# Patient Record
Sex: MALE | Race: WHITE | NOT HISPANIC OR LATINO | Employment: FULL TIME | URBAN - METROPOLITAN AREA
[De-identification: names, ages, dates, MRNs, and addresses within clinical notes are randomized per-mention and may not be internally consistent; named-entity substitution may affect disease eponyms.]

---

## 2017-01-23 DIAGNOSIS — E78.5 HYPERLIPIDEMIA: ICD-10-CM

## 2017-02-16 ENCOUNTER — APPOINTMENT (EMERGENCY)
Dept: CT IMAGING | Facility: HOSPITAL | Age: 42
End: 2017-02-16
Payer: COMMERCIAL

## 2017-02-16 ENCOUNTER — HOSPITAL ENCOUNTER (EMERGENCY)
Facility: HOSPITAL | Age: 42
Discharge: HOME | End: 2017-02-16
Attending: EMERGENCY MEDICINE | Admitting: EMERGENCY MEDICINE
Payer: COMMERCIAL

## 2017-02-16 ENCOUNTER — APPOINTMENT (EMERGENCY)
Dept: RADIOLOGY | Facility: HOSPITAL | Age: 42
End: 2017-02-16
Payer: COMMERCIAL

## 2017-02-16 VITALS
BODY MASS INDEX: 31.56 KG/M2 | WEIGHT: 220.46 LBS | HEIGHT: 70 IN | TEMPERATURE: 98.6 F | OXYGEN SATURATION: 96 % | HEART RATE: 90 BPM | DIASTOLIC BLOOD PRESSURE: 81 MMHG | SYSTOLIC BLOOD PRESSURE: 130 MMHG | RESPIRATION RATE: 14 BRPM

## 2017-02-16 DIAGNOSIS — R07.1 CHEST PAIN ON BREATHING: Primary | ICD-10-CM

## 2017-02-16 LAB
ANION GAP SERPL CALCULATED.3IONS-SCNC: 8 MMOL/L (ref 4–13)
ATRIAL RATE: 77 BPM
BASOPHILS # BLD AUTO: 0.04 THOUSANDS/ΜL (ref 0–0.1)
BASOPHILS NFR BLD AUTO: 1 % (ref 0–1)
BUN SERPL-MCNC: 21 MG/DL (ref 5–25)
CALCIUM SERPL-MCNC: 9.4 MG/DL (ref 8.3–10.1)
CHLORIDE SERPL-SCNC: 102 MMOL/L (ref 100–108)
CO2 SERPL-SCNC: 30 MMOL/L (ref 21–32)
CREAT SERPL-MCNC: 1.31 MG/DL (ref 0.6–1.3)
DEPRECATED D DIMER PPP: <270 NG/ML (FEU) (ref 0–424)
EOSINOPHIL # BLD AUTO: 0.05 THOUSAND/ΜL (ref 0–0.61)
EOSINOPHIL NFR BLD AUTO: 1 % (ref 0–6)
ERYTHROCYTE [DISTWIDTH] IN BLOOD BY AUTOMATED COUNT: 12.4 % (ref 11.6–15.1)
GFR SERPL CREATININE-BSD FRML MDRD: >60 ML/MIN/1.73SQ M
GLUCOSE SERPL-MCNC: 101 MG/DL (ref 65–140)
HCT VFR BLD AUTO: 46.3 % (ref 36.5–49.3)
HGB BLD-MCNC: 15.8 G/DL (ref 12–17)
LYMPHOCYTES # BLD AUTO: 1.48 THOUSANDS/ΜL (ref 0.6–4.47)
LYMPHOCYTES NFR BLD AUTO: 25 % (ref 14–44)
MCH RBC QN AUTO: 30.6 PG (ref 26.8–34.3)
MCHC RBC AUTO-ENTMCNC: 34.1 G/DL (ref 31.4–37.4)
MCV RBC AUTO: 90 FL (ref 82–98)
MONOCYTES # BLD AUTO: 0.56 THOUSAND/ΜL (ref 0.17–1.22)
MONOCYTES NFR BLD AUTO: 10 % (ref 4–12)
NEUTROPHILS # BLD AUTO: 3.68 THOUSANDS/ΜL (ref 1.85–7.62)
NEUTS SEG NFR BLD AUTO: 63 % (ref 43–75)
NRBC BLD AUTO-RTO: 0 /100 WBCS
P AXIS: 57 DEGREES
PLATELET # BLD AUTO: 281 THOUSANDS/UL (ref 149–390)
PMV BLD AUTO: 10.5 FL (ref 8.9–12.7)
POTASSIUM SERPL-SCNC: 3.9 MMOL/L (ref 3.5–5.3)
PR INTERVAL: 132 MS
QRS AXIS: 10 DEGREES
QRSD INTERVAL: 102 MS
QT INTERVAL: 370 MS
QTC INTERVAL: 418 MS
RBC # BLD AUTO: 5.16 MILLION/UL (ref 3.88–5.62)
SODIUM SERPL-SCNC: 140 MMOL/L (ref 136–145)
T WAVE AXIS: 49 DEGREES
TROPONIN I SERPL-MCNC: <0.02 NG/ML
VENTRICULAR RATE: 77 BPM
WBC # BLD AUTO: 5.84 THOUSAND/UL (ref 4.31–10.16)

## 2017-02-16 PROCEDURE — 85379 FIBRIN DEGRADATION QUANT: CPT | Performed by: EMERGENCY MEDICINE

## 2017-02-16 PROCEDURE — 71275 CT ANGIOGRAPHY CHEST: CPT

## 2017-02-16 PROCEDURE — 84484 ASSAY OF TROPONIN QUANT: CPT | Performed by: EMERGENCY MEDICINE

## 2017-02-16 PROCEDURE — 36415 COLL VENOUS BLD VENIPUNCTURE: CPT | Performed by: EMERGENCY MEDICINE

## 2017-02-16 PROCEDURE — 85025 COMPLETE CBC W/AUTO DIFF WBC: CPT | Performed by: EMERGENCY MEDICINE

## 2017-02-16 PROCEDURE — 93005 ELECTROCARDIOGRAM TRACING: CPT

## 2017-02-16 PROCEDURE — 99285 EMERGENCY DEPT VISIT HI MDM: CPT

## 2017-02-16 PROCEDURE — 80048 BASIC METABOLIC PNL TOTAL CA: CPT | Performed by: EMERGENCY MEDICINE

## 2017-02-16 PROCEDURE — 93005 ELECTROCARDIOGRAM TRACING: CPT | Performed by: EMERGENCY MEDICINE

## 2017-02-16 RX ORDER — ASPIRIN 81 MG/1
324 TABLET, CHEWABLE ORAL ONCE
Status: COMPLETED | OUTPATIENT
Start: 2017-02-16 | End: 2017-02-16

## 2017-02-16 RX ADMIN — IOHEXOL 85 ML: 350 INJECTION, SOLUTION INTRAVENOUS at 16:25

## 2017-02-16 RX ADMIN — ASPIRIN 81 MG 324 MG: 81 TABLET ORAL at 15:58

## 2017-02-17 ENCOUNTER — APPOINTMENT (OUTPATIENT)
Dept: LAB | Facility: OTHER | Age: 42
End: 2017-02-17
Payer: COMMERCIAL

## 2017-02-17 ENCOUNTER — TRANSCRIBE ORDERS (OUTPATIENT)
Dept: LAB | Facility: OTHER | Age: 42
End: 2017-02-17

## 2017-02-17 DIAGNOSIS — E78.5 HYPERLIPIDEMIA: ICD-10-CM

## 2017-02-17 LAB
ALBUMIN SERPL BCP-MCNC: 3.9 G/DL (ref 3.5–5)
ALP SERPL-CCNC: 75 U/L (ref 46–116)
ALT SERPL W P-5'-P-CCNC: 73 U/L (ref 12–78)
ANION GAP SERPL CALCULATED.3IONS-SCNC: 4 MMOL/L (ref 4–13)
AST SERPL W P-5'-P-CCNC: 33 U/L (ref 5–45)
BILIRUB SERPL-MCNC: 0.57 MG/DL (ref 0.2–1)
BUN SERPL-MCNC: 11 MG/DL (ref 5–25)
CALCIUM SERPL-MCNC: 9.1 MG/DL (ref 8.3–10.1)
CHLORIDE SERPL-SCNC: 104 MMOL/L (ref 100–108)
CHOLEST SERPL-MCNC: 237 MG/DL (ref 50–200)
CO2 SERPL-SCNC: 30 MMOL/L (ref 21–32)
CREAT SERPL-MCNC: 1.23 MG/DL (ref 0.6–1.3)
GFR SERPL CREATININE-BSD FRML MDRD: >60 ML/MIN/1.73SQ M
GLUCOSE SERPL-MCNC: 101 MG/DL (ref 65–140)
HDLC SERPL-MCNC: 51 MG/DL (ref 40–60)
LDLC SERPL CALC-MCNC: 137 MG/DL (ref 0–100)
POTASSIUM SERPL-SCNC: 5.3 MMOL/L (ref 3.5–5.3)
PROT SERPL-MCNC: 7.6 G/DL (ref 6.4–8.2)
SODIUM SERPL-SCNC: 138 MMOL/L (ref 136–145)
TRIGL SERPL-MCNC: 244 MG/DL

## 2017-02-17 PROCEDURE — 80053 COMPREHEN METABOLIC PANEL: CPT

## 2017-02-17 PROCEDURE — 36415 COLL VENOUS BLD VENIPUNCTURE: CPT

## 2017-02-17 PROCEDURE — 80061 LIPID PANEL: CPT

## 2017-02-19 ENCOUNTER — GENERIC CONVERSION - ENCOUNTER (OUTPATIENT)
Dept: OTHER | Facility: OTHER | Age: 42
End: 2017-02-19

## 2017-02-24 ENCOUNTER — ALLSCRIPTS OFFICE VISIT (OUTPATIENT)
Dept: OTHER | Facility: OTHER | Age: 42
End: 2017-02-24

## 2017-02-24 DIAGNOSIS — R07.89 OTHER CHEST PAIN: ICD-10-CM

## 2017-07-02 DIAGNOSIS — R07.89 OTHER CHEST PAIN: ICD-10-CM

## 2017-07-02 DIAGNOSIS — E78.5 HYPERLIPIDEMIA: ICD-10-CM

## 2017-09-07 ENCOUNTER — ALLSCRIPTS OFFICE VISIT (OUTPATIENT)
Dept: OTHER | Facility: OTHER | Age: 42
End: 2017-09-07

## 2017-09-07 DIAGNOSIS — E78.5 HYPERLIPIDEMIA: ICD-10-CM

## 2017-09-07 DIAGNOSIS — Z13.1 ENCOUNTER FOR SCREENING FOR DIABETES MELLITUS: ICD-10-CM

## 2017-10-13 ENCOUNTER — ALLSCRIPTS OFFICE VISIT (OUTPATIENT)
Dept: OTHER | Facility: OTHER | Age: 42
End: 2017-10-13

## 2017-10-13 DIAGNOSIS — M10.9 GOUT: ICD-10-CM

## 2017-10-16 NOTE — PROGRESS NOTES
Assessment  1  Tinea corporis (110 5) (B35 4)   2  Acute gouty arthropathy (274 01) (M10 9)    Plan  Acute gouty arthropathy    · Indomethacin 50 MG Oral Capsule; TAKE 1 CAPSULE 3 TIMES DAILY WITH FOOD  AS NEEDED   · (1) URIC ACID; Status:Active; Requested for:13Oct2017;   Tinea corporis    · Terbinafine HCl - 250 MG Oral Tablet; TAKE 1 TABLET DAILY    Discussion/Summary    1  Tinea discussed with patient with how widespread will treat with oral medications the terbinafine  patient will call with an update of symptoms  Toe complaints appearing to be related to having gout discussed with patient will give the indomethacin for pain as needed and also discussed checking the uric acid  Also discussed watching his intake of beer and red meat  Patient will call for any additional symptoms  Possible side effects of new medications were reviewed with the patient/guardian today  The treatment plan was reviewed with the patient/guardian  The patient/guardian understands and agrees with the treatment plan      Chief Complaint  Rash and foot pains      History of Present Illness  Patient here with complaints of having a rash under both arms and red and raised and itch and tried hydrocortisone cream stopped the itch but did not go away  Patient also has complaints of having a right great toe pain in the base of the toe and thinking maybe gout one episode about 6 months ago similar taking ibuprofen elevated and put ice on the toe improving can walk on the foot  No gout in the family  Patient was drinking beer and eating meat camping out over the weekend  Review of Systems    Constitutional: as noted in HPI  Eyes: No complaints of eye pain, no red eyes, no discharge from eyes, no itchy eyes  ENT: no complaints of earache, no hearing loss, no nosebleeds, no nasal discharge, no sore throat, no hoarseness     Cardiovascular: No complaints of slow heart rate, no fast heart rate, no chest pain, no palpitations, no leg claudication, no lower extremity  Respiratory: No complaints of shortness of breath, no wheezing, no cough, no SOB on exertion, no orthopnea or PND  Gastrointestinal: No complaints of abdominal pain, no constipation, no nausea or vomiting, no diarrhea or bloody stools  Musculoskeletal: as noted in HPI  Integumentary: as noted in HPI  Neurological: No compliants of headache, no confusion, no convulsions, no numbness or tingling, no dizziness or fainting, no limb weakness, no difficulty walking  Psychiatric: Is not suicidal, no sleep disturbances, no anxiety or depression, no change in personality, no emotional problems  ROS reviewed  Active Problems  1  Atopic dermatitis (691 8) (L20 9)   2  Atypical chest pain (786 59) (R07 89)   3  Diabetes mellitus screening (V77 1) (Z13 1)   4  Diarrhea (787 91) (R19 7)   5  Hyperlipidemia (272 4) (E78 5)   6  Need for diphtheria-tetanus-pertussis (Tdap) vaccine, adult/adolescent (V06 1) (Z23)   7  Need for prophylactic vaccination and inoculation against influenza (V04 81) (Z23)   8  Screening for lipid disorders (V77 91) (O98 122)    Past Medical History  1  History of No acute medical problems    The active problems and past medical history were reviewed and updated today  Surgical History    The surgical history was reviewed and updated today  Family History  Mother    1  Denied: Family history of substance abuse   2  Denied: Family history of Mental problems   3  Family history of Ovarian cancer  Father    4  Denied: Family history of substance abuse   5  Denied: Family history of Mental problems    The family history was reviewed and updated today  Social History   · Never a smoker   · Social alcohol use (Z78 9)  The social history was reviewed and updated today  Current Meds   1  Hydrocortisone 2 5 % External Ointment; APPLY SPARINGLY TO THE AFFECTED   AREA(S) TWICE DAILY;    Therapy: [id number] to (Last Rx:[id number]) Ordered    The medication list was reviewed and updated today  Allergies  1  No Known Drug Allergies  2  Gluten    Vitals  Vital Signs    Recorded: [id number] 02:43PM   Temperature 98 F   Heart Rate 86   Systolic 207   Diastolic 84   Height 5 ft 10 in   Weight 215 lb 2 08 oz   BMI Calculated 30 87   BSA Calculated 2 16   O2 Saturation 98     Physical Exam    Constitutional   General appearance: No acute distress, well appearing and well nourished  Pulmonary   Respiratory effort: No increased work of breathing or signs of respiratory distress  Auscultation of lungs: Clear to auscultation, equal breath sounds bilaterally, no wheezes, no rales, no rhonci  Cardiovascular   Auscultation of heart: Normal rate and rhythm, normal S1 and S2, without murmurs  Examination of extremities for edema and/or varicosities: Normal     Abdomen   Abdomen: Non-tender, no masses  Musculoskeletal   Digits and nails: Abnormal  -- right great toe base erythematous and tender  Skin   Skin and subcutaneous tissue: Abnormal  -- bilateral underarms circular raised lesions circular scaling circular with erythematous base          Signatures   Electronically signed by : Cat Woodward, [address] [place] [address]; Oct 13 2017  9:47PM EST                       (Author)    Electronically signed by : Malorie Bonilla MD; Oct 15 2017  1:03PM EST                       (Co-author)

## 2017-10-25 ENCOUNTER — GENERIC CONVERSION - ENCOUNTER (OUTPATIENT)
Dept: OTHER | Facility: OTHER | Age: 42
End: 2017-10-25

## 2018-01-10 NOTE — PROGRESS NOTES
Assessment    1  Need for diphtheria-tetanus-pertussis (Tdap) vaccine, adult/adolescent (V06 1) (Z23)   2  Screening for lipid disorders (V77 91) (Z13 220)   3  Diabetes mellitus screening (V77 1) (Z13 1)   4  Diarrhea (787 91) (R19 7)    Plan  Diabetes mellitus screening    · (1) COMPREHENSIVE METABOLIC PANEL; Status:Active; Requested for:23Mar2016;    · (1) LIPID PANEL, FASTING; Status:Active; Requested for:23Mar2016;   Diarrhea    · (1) CBC/ PLT (NO DIFF); Status:Active; Requested for:23Mar2016;    · (1) TISSUE TRANSGLUTAMINASE IGA; Status:Active; Requested for:23Mar2016;   Need for diphtheria-tetanus-pertussis (Tdap) vaccine, adult/adolescent    · Stop: Adacel 5-2-15 5 LF-MCG/0 5 Intramuscular Suspension  Need for prophylactic vaccination and inoculation against influenza    · Stop: Fluzone Quadrivalent 0 5 ML Intramuscular Suspension    Discussion/Summary    Diarrhea- gluten enteropathy, will order IgA TTG, CBC to look for anemia  Advise to avoid gluten in the meantime  Health Maintenance- lipid profile and CMP ordered  Chief Complaint  Patient is here for wellness visit and possible allergy to gluten  History of Present Illness  HPI: Patient is here for health maintenance, denies any medical problems not taking any medications  Patient does not smoke  Patient did not receive flu vaccine this year  Patient says that he tries to have a healthy diet and avoids gluten as it makes him sick and gives him diarrhea and heart burn  Patient says that when he eats foods containing gluten he has diarrhea and upset stomach  He has bee avoiding those types of foods, has never been tested for it  Elevated BP- patient says that he has elevated values in the past however never been diagnosed with Hypertension and never been on medications  Review of Systems    Constitutional: No fever or chills, feels well, no tiredness, no recent weight gain or weight loss     Cardiovascular: No complaints of slow heart rate, no fast heart rate, no chest pain, no palpitations, no leg claudication, no lower extremity  Respiratory: No complaints of shortness of breath, no wheezing, no cough, no SOB on exertion, no orthopnea or PND  Gastrointestinal: diarrhea, but as noted in HPI  Musculoskeletal: No complaints of arthralgia, no myalgias, no joint swelling or stiffness, no limb pain or swelling  Integumentary: No complaints of skin rash or skin lesions, no itching, no skin wound, no dry skin  ROS reviewed  Active Problems    1  Need for prophylactic vaccination and inoculation against influenza (V04 81) (Z23)    Past Medical History    · History of No acute medical problems    Family History    · Denied: Family history of substance abuse   · Denied: Family history of Mental problems   · Family history of Ovarian cancer    · Denied: Family history of substance abuse   · Denied: Family history of Mental problems    Social History    · Never a smoker   · Social alcohol use (Z78 9)    Current Meds   1  No Reported Medications Recorded    Allergies    1  No Known Drug Allergies    2  Gluten    Vitals   Recorded: 03AKB1434 09:29AM   Temperature 97 9 F   Heart Rate 71   Systolic 705   Diastolic 82   Height 5 ft 10 in   Weight 213 lb 2 08 oz   BMI Calculated 30 58   BSA Calculated 2 15   O2 Saturation 97     Physical Exam    Constitutional   General appearance: No acute distress, well appearing and well nourished  Pulmonary   Respiratory effort: No increased work of breathing or signs of respiratory distress  Auscultation of lungs: Clear to auscultation  Cardiovascular   Auscultation of heart: Normal rate and rhythm, normal S1 and S2, no murmurs  Abdomen   Abdomen: Non-tender, no masses      Musculoskeletal   Gait and station: Normal        Results/Data  PHQ-9 Adult Depression Screening 23Mar2016 09:40AM User, [employer]     Test Name Result Flag Reference   PHQ-9 Adult Depression Score 0     Q1: 0, Q2: 0, Q3: 0, Q4: 0, Q5: 0, Q6: 0, Q7: 0, Q8: 0, Q9: 0   PHQ-9 Adult Depression Screening Negative     PHQ-9 Difficulty Level Not difficult at all     PHQ-9 Severity No Depression         Signatures   Electronically signed by : Gi Denise MD; Mar 23 2016 10:10AM EST                       (Author)

## 2018-01-10 NOTE — RESULT NOTES
Message   Needs OV to go over labs  Has seen Shawanda Adele only     Verified Results  (1) LIPID PANEL, FASTING [id number] 09:25AM Irwin Moralez Order Number: [id number]     Test Name Result Flag Reference   CHOLESTEROL 237 mg/dL H    HDL,DIRECT 51 mg/dL  40-60   Specimen collection should occur prior to Metamizole administration due to the potential for falsely depressed results  LDL CHOLESTEROL CALCULATED 137 mg/dL H 0-100   - Patient Instructions: This is a fasting blood test  Water,black tea or black  coffee only after 9:00pm the night before test   Drink 2 glasses of water the morning of test     - Patient Instructions: This is a fasting blood test  Water,black tea or black  coffee only after 9:00pm the night before test Drink 2 glasses of water the morning of test   Triglyceride:         Normal              <150 mg/dl       Borderline High    150-199 mg/dl       High               200-499 mg/dl       Very High          >499 mg/dl  Cholesterol:         Desirable        <200 mg/dl      Borderline High  200-239 mg/dl      High             >239 mg/dl  HDL Cholesterol:        High    >59 mg/dL      Low     <41 mg/dL  LDL CALCULATED:    This screening LDL is a calculated result  It does not have the accuracy of the Direct Measured LDL in the monitoring of patients with hyperlipidemia and/or statin therapy  Direct Measure LDL (KQW715) must be ordered separately in these patients  TRIGLYCERIDES 244 mg/dL H <=150   Specimen collection should occur prior to N-Acetylcysteine or Metamizole administration due to the potential for falsely depressed results  (1) COMPREHENSIVE METABOLIC PANEL [id number] 20:14NI Irwin Moralez Order Number: [id number]     Test Name Result Flag Reference   GLUCOSE,RANDM 101 mg/dL     If the patient is fasting, the ADA then defines impaired fasting glucose as > 100 mg/dL and diabetes as > or equal to 123 mg/dL     SODIUM 138 mmol/L  136-145 POTASSIUM 5 3 mmol/L  3 5-5 3   CHLORIDE 104 mmol/L  100-108   CARBON DIOXIDE 30 mmol/L  21-32   ANION GAP (CALC) 4 mmol/L  4-13   BLOOD UREA NITROGEN 11 mg/dL  5-25   CREATININE 1 23 mg/dL  0 60-1 30   Standardized to IDMS reference method   CALCIUM 9 1 mg/dL  8 3-10 1   BILI, TOTAL 0 57 mg/dL  0 20-1 00   ALK PHOSPHATAS 75 U/L     ALT (SGPT) 73 U/L  12-78   AST(SGOT) 33 U/L  5-45   ALBUMIN 3 9 g/dL  3 5-5 0   TOTAL PROTEIN 7 6 g/dL  6 4-8 2   eGFR Non-African American      >60 0 ml/min/1 73sq m   - Patient Instructions: This is a fasting blood test  Water,black tea or black  coffee only after 9:00pm the night before test Drink 2 glasses of water the morning of test   National Kidney Disease Education Program recommendations are as follows:  GFR calculation is accurate only with a steady state creatinine  Chronic Kidney disease less than 60 ml/min/1 73 sq  meters  Kidney failure less than 15 ml/min/1 73 sq  meters

## 2018-01-13 VITALS
HEIGHT: 70 IN | DIASTOLIC BLOOD PRESSURE: 84 MMHG | HEART RATE: 86 BPM | SYSTOLIC BLOOD PRESSURE: 128 MMHG | BODY MASS INDEX: 30.8 KG/M2 | WEIGHT: 215.13 LBS | TEMPERATURE: 98 F | OXYGEN SATURATION: 98 %

## 2018-01-14 VITALS
HEART RATE: 74 BPM | OXYGEN SATURATION: 98 % | BODY MASS INDEX: 30.49 KG/M2 | SYSTOLIC BLOOD PRESSURE: 128 MMHG | WEIGHT: 213 LBS | DIASTOLIC BLOOD PRESSURE: 88 MMHG | HEIGHT: 70 IN

## 2018-01-14 VITALS
BODY MASS INDEX: 31.07 KG/M2 | TEMPERATURE: 98.4 F | DIASTOLIC BLOOD PRESSURE: 86 MMHG | SYSTOLIC BLOOD PRESSURE: 130 MMHG | WEIGHT: 217 LBS | HEART RATE: 84 BPM | HEIGHT: 70 IN | OXYGEN SATURATION: 97 %

## 2018-02-13 ENCOUNTER — TELEPHONE (OUTPATIENT)
Dept: FAMILY MEDICINE CLINIC | Facility: CLINIC | Age: 43
End: 2018-02-13

## 2018-02-13 NOTE — TELEPHONE ENCOUNTER
Patient saw both dr Aj De La Garza and you about a rash, he was given two medications and neither helped  He called dermatology and was told it would be 6 months before they could get him in, but added if he was referred by his pcp they could get him in sooner  The rash is getting worse, raised, red itches and burns    Did you want him to come in again or just make the referral?

## 2018-02-15 ENCOUNTER — OFFICE VISIT (OUTPATIENT)
Dept: FAMILY MEDICINE CLINIC | Facility: CLINIC | Age: 43
End: 2018-02-15
Payer: COMMERCIAL

## 2018-02-15 VITALS
HEIGHT: 70 IN | HEART RATE: 85 BPM | DIASTOLIC BLOOD PRESSURE: 92 MMHG | SYSTOLIC BLOOD PRESSURE: 130 MMHG | WEIGHT: 228.6 LBS | BODY MASS INDEX: 32.73 KG/M2 | OXYGEN SATURATION: 97 % | TEMPERATURE: 98.1 F

## 2018-02-15 DIAGNOSIS — L24.89 IRRITANT CONTACT DERMATITIS DUE TO OTHER AGENTS: Primary | ICD-10-CM

## 2018-02-15 PROCEDURE — 99213 OFFICE O/P EST LOW 20 MIN: CPT | Performed by: NURSE PRACTITIONER

## 2018-02-15 RX ORDER — TRIAMCINOLONE ACETONIDE 1 MG/G
CREAM TOPICAL 2 TIMES DAILY
Qty: 30 G | Refills: 0 | Status: SHIPPED | OUTPATIENT
Start: 2018-02-15 | End: 2018-10-24

## 2018-02-15 NOTE — PATIENT INSTRUCTIONS
Complications of Infection   AMBULATORY CARE:   Complications of infection  can happen if an infection is not diagnosed and treated early  Some infections may have complications even when they are treated early  The infection can spread from one place in your body to the entire body through your bloodstream  Early diagnosis and treatment may prevent complications such as bacteremia, sepsis, and septic shock  These are serious, life-threatening conditions that need immediate treatment  · Bacteremia  is when there is bacteria in the blood  Bacteremia can happen when infections in other parts of the body, such as the lungs, kidneys, or skin, travel to the blood  It can also happen when indwelling catheters, such as a central venous access devices, pacemaker wires, or urinary catheters become infected  A central venous access device is a special IV that is placed in a large vein and left there for an extended period of time  · Sepsis  happens when an infection spreads and causes the body to react strongly to the germs  The body's defense system normally releases chemicals to fight off infection at the infected area  In sepsis, chemicals are released throughout the body  The chemicals cause inflammation and can cause clotting in small blood vessels that is difficult to control  Inflammation and clotting decreases blood flow and oxygen to organs  This may cause them to stop working correctly  Sepsis is also called systemic inflammatory response syndrome (SIRS) due to infection  · Septic shock  is a severe type of sepsis that happens as sepsis gets worse and causes multiple organs to shut down  The blood pressure drops very low and organs do not get enough blood  This may cause permanent damage to organs    Signs and symptoms that an infection has become worse:   · Fever or very low body temperature with chills and violent shaking    · Swelling in the ankles or legs    · A change in mental status such as confusion, loss of consciousness, or seizures    · A fast or irregular heartbeat    · Urinating very little or not at all     · Difficulty breathing, dizziness, or weakness    · A rash or warm, red skin  Call 911 or have someone else call for any of the following:   · You have any of the following signs of a heart attack:      ¨ Squeezing, pressure, or pain in your chest that lasts longer than 5 minutes or returns    ¨ Discomfort or pain in your back, neck, jaw, stomach, or arm     ¨ Trouble breathing    ¨ Nausea or vomiting    ¨ Lightheadedness or a sudden cold sweat, especially with chest pain or trouble breathing    · You have a seizure or lose consciousness  · You have trouble breathing  · Your lips or fingernails are blue  · You feel extremely weak and have a hard time moving  Seek care immediately if:   · Your symptoms, such as fever, get worse, even if you are taking medicine to treat the infection  · You have increased swelling in your legs, feet, or abdomen  · You feel weak, dizzy, or faint  · You stop urinating or urinate very little  Contact your healthcare provider if:   · You have questions or concerns about your condition or care  Treatment  may depend on how severe the complications are  You may need monitoring and treatment in the hospital  You may  need any of the following:  · Removal or change of a catheter  may be needed to get rid of the infection  · Medicines  may be given to increase your blood pressure and blood flow to your organs  Antibiotics may be given to treat an infection  Medicines may also be given to decrease inflammation, control your blood sugar, prevent stomach ulcers, and prevent blood clots  · Surgery or other procedures  may be needed to treat problems causing sepsis or related to the complications of your infection  This may include draining an abscess or removing infected tissue    Follow up with your healthcare provider as directed:  Write down your questions so you remember to ask them during your visits  © 2017 [employer] Information is for End User's use only and may not be sold, redistributed or otherwise used for commercial purposes  All illustrations and images included in CareNotes® are the copyrighted property of [employer]  or Ervin Garza  The above information is an  only  It is not intended as medical advice for individual conditions or treatments  Talk to your doctor, nurse or pharmacist before following any medical regimen to see if it is safe and effective for you

## 2018-02-15 NOTE — PROGRESS NOTES
Assessment/Plan:    No problem-specific Assessment & Plan notes found for this encounter  Diagnoses and all orders for this visit:    Irritant contact dermatitis due to other agents  Comments:  Case reviewed with Dr Agustin Cruz will trial a topical steroid and if not clearing referral for dermatology   Orders:  -     triamcinolone (KENALOG) 0 1 % cream; Apply topically 2 (two) times a day          Subjective:      Patient ID: Yoly Salazar is a 40 y o  male  Rash   This is a chronic problem  The current episode started more than 1 month ago  The problem is unchanged  The affected locations include the left axilla and right axilla  The rash is characterized by pain, itchiness and burning  It is unknown if there was an exposure to a precipitant  Pertinent negatives include no anorexia, congestion, cough, diarrhea, eye pain, facial edema, fatigue, fever, joint pain, nail changes, rhinorrhea, shortness of breath, sore throat or vomiting  Past treatments include anti-itch cream, topical steroids and moisturizer  The treatment provided no relief  The following portions of the patient's history were reviewed and updated as appropriate: He  has no past medical history on file  He  does not have any pertinent problems on file  He  has no past surgical history on file  His family history is not on file  He  reports that he has never smoked  He does not have any smokeless tobacco history on file  He reports that he drinks alcohol  He reports that he does not use drugs  He has No Known Allergies       Review of Systems   Constitutional: Negative for fatigue and fever  HENT: Negative for congestion, rhinorrhea and sore throat  Eyes: Negative for pain  Respiratory: Negative for cough and shortness of breath  Gastrointestinal: Negative for anorexia, diarrhea and vomiting  Musculoskeletal: Negative for joint pain  Skin: Positive for rash  Negative for nail changes           Objective:    Vitals: 02/15/18 1050   BP: 130/92   Pulse: 85   Temp: 98 1 °F (36 7 °C)   SpO2: 97%        Physical Exam   Constitutional: He is oriented to person, place, and time  Vital signs are normal  He appears well-developed and well-nourished  No distress  HENT:   Head: Normocephalic and atraumatic  Eyes: Pupils are equal, round, and reactive to light  Neck: Normal range of motion  No thyromegaly present  Cardiovascular: Normal rate, regular rhythm, normal heart sounds and intact distal pulses  No murmur heard  Pulmonary/Chest: Effort normal and breath sounds normal  No respiratory distress  He has no wheezes  Abdominal: Soft  Bowel sounds are normal    Musculoskeletal: Normal range of motion  Neurological: He is alert and oriented to person, place, and time  Skin: Skin is warm and dry  Rash noted  Psychiatric: He has a normal mood and affect

## 2018-03-15 ENCOUNTER — TELEPHONE (OUTPATIENT)
Dept: FAMILY MEDICINE CLINIC | Facility: CLINIC | Age: 43
End: 2018-03-15

## 2018-03-15 DIAGNOSIS — R21 RASH AND NONSPECIFIC SKIN ERUPTION: Primary | ICD-10-CM

## 2018-03-29 ENCOUNTER — TELEPHONE (OUTPATIENT)
Dept: FAMILY MEDICINE CLINIC | Facility: CLINIC | Age: 43
End: 2018-03-29

## 2018-03-29 NOTE — TELEPHONE ENCOUNTER
Pt called for  Dermatologist info because he hadnt heard from them in regard to his rash at armpits  Its getting worse  He called them and they had no record of you referring him to them  The earliest new patient appt is sept  He cant wait till then  He said their office stated if YOU call them and refer him the appt will be sooner   He wasn't happy

## 2018-05-07 ENCOUNTER — OFFICE VISIT (OUTPATIENT)
Dept: FAMILY MEDICINE CLINIC | Facility: CLINIC | Age: 43
End: 2018-05-07
Payer: COMMERCIAL

## 2018-05-07 ENCOUNTER — DOCUMENTATION (OUTPATIENT)
Dept: FAMILY MEDICINE CLINIC | Facility: CLINIC | Age: 43
End: 2018-05-07

## 2018-05-07 ENCOUNTER — TELEPHONE (OUTPATIENT)
Dept: FAMILY MEDICINE CLINIC | Facility: CLINIC | Age: 43
End: 2018-05-07

## 2018-05-07 VITALS
DIASTOLIC BLOOD PRESSURE: 88 MMHG | TEMPERATURE: 98.3 F | SYSTOLIC BLOOD PRESSURE: 140 MMHG | BODY MASS INDEX: 30.69 KG/M2 | HEIGHT: 70 IN | WEIGHT: 214.4 LBS | HEART RATE: 82 BPM | OXYGEN SATURATION: 98 %

## 2018-05-07 DIAGNOSIS — M10.9 ACUTE GOUTY ARTHROPATHY: Primary | ICD-10-CM

## 2018-05-07 DIAGNOSIS — M10.9 ACUTE GOUT OF RIGHT ANKLE, UNSPECIFIED CAUSE: Primary | ICD-10-CM

## 2018-05-07 PROCEDURE — 3008F BODY MASS INDEX DOCD: CPT | Performed by: FAMILY MEDICINE

## 2018-05-07 PROCEDURE — 99213 OFFICE O/P EST LOW 20 MIN: CPT | Performed by: FAMILY MEDICINE

## 2018-05-07 RX ORDER — PREDNISONE 20 MG/1
20 TABLET ORAL 2 TIMES DAILY WITH MEALS
Qty: 10 TABLET | Refills: 1 | Status: SHIPPED | OUTPATIENT
Start: 2018-05-07 | End: 2018-05-11 | Stop reason: SDUPTHER

## 2018-05-07 RX ORDER — TRAMADOL HYDROCHLORIDE 50 MG/1
50 TABLET ORAL EVERY 6 HOURS PRN
Qty: 20 TABLET | Refills: 0 | Status: SHIPPED | OUTPATIENT
Start: 2018-05-07 | End: 2018-10-24

## 2018-05-07 NOTE — PATIENT INSTRUCTIONS
Gout   AMBULATORY CARE:   Gout  is a form of arthritis that causes severe joint pain and stiffness  Acute gout pain starts suddenly, gets worse quickly, and stops on its own  Acute gout can become chronic and cause permanent damage to your joints  Seek care immediately if:   · You have severe pain in one or more of your joints that you cannot tolerate  · You have a fever or redness that spreads beyond the joint area  Contact your healthcare provider if:   · You have new symptoms, such as a rash, after you start gout treatment  · Your joint pain and swelling do not go away, even after treatment  · You are not urinating as much or as often as you usually do  · You have trouble taking your gout medicines  · You have questions or concerns about your condition or care  Stages of gout:   · Hyperuricemia  starts with high levels of uric acid  Hyperuricemia is not gout, but it increases your risk for gout  You may have no symptoms at this stage, and it usually does not need treatment  · Acute gouty arthritis  starts with a sudden attack of pain and swelling, usually in 1 joint  The attack may last from a few days to 2 weeks  · Intercritical gout  is the time between attacks  You may go months or years without another attack  You will not have joint pain or stiffness, but this does not mean your gout is cured  You will still need treatment to prevent chronic gout  · Chronic tophaceous gout  develops if gout is not treated  Large amounts of uric acid crystals, called tophi, collect around your joints  The crystals can destroy or deform the joints  Gout attacks occur more often, and last hours to weeks  More than 1 joint may be painful and swollen  At this stage, gout symptoms do not go away on their own  Treatment  can make your symptoms stop sooner, prevent attacks, and decrease your risk of joint damage   You may need any of the following:  · Medicines:      ¨ NSAIDs , such as ibuprofen, help decrease swelling, pain, and fever  This medicine is available with or without a doctor's order  NSAIDs can cause stomach bleeding or kidney problems in certain people  If you take blood thinner medicine, always ask your healthcare provider if NSAIDs are safe for you  Always read the medicine label and follow directions  ¨ Gout medicine  decreases joint pain and swelling  It may also be given to prevent new gout attacks  ¨ Steroids  reduce inflammation and can help your joint stiffness and pain during gout attacks  ¨ Uric acid medicine  may be given to reduce the amount of uric acid your body makes  Some medicines may help you pass more uric acid when you urinate  ¨ Take your medicine as directed  Contact your healthcare provider if you think your medicine is not helping or if you have side effects  Tell him or her if you are allergic to any medicine  Keep a list of the medicines, vitamins, and herbs you take  Include the amounts, and when and why you take them  Bring the list or the pill bottles to follow-up visits  Carry your medicine list with you in case of an emergency  · Surgery  called a bone graft may be needed for tophi that are painful or infected  Bone in the joint may be replaced with bone taken from another place in your body  Ask your healthcare provider for more information about bone graft surgery  Manage gout:   · Rest your painful joint so it can heal   Your healthcare provider may recommend crutches or a walker if the affected joint is in a leg  · Apply ice to your joint  Ice decreases pain and swelling  Use an ice pack, or put crushed ice in a plastic bag  Cover the ice pack or bag with a towel before you apply it to your painful joint  Apply ice for 15 to 20 minutes every hour, or as directed  · Elevate your joint  Elevation helps reduce swelling and pain  Raise your joint above the level of your heart as often as you can   Prop your painful joint on pillows to keep it above your heart comfortably  · Go to physical therapy if directed  A physical therapist can teach you exercises to improve flexibility and range of motion  Prevent gout attacks:   · Do not eat high-purine foods  These foods include meats, seafood, asparagus, spinach, cauliflower, and some types of beans  Healthcare providers may tell you to eat more low-fat milk products, such as yogurt  Milk products may decrease your risk for gout attacks  Vitamin C and coffee may also help  Your healthcare provider or dietitian can help you create a meal plan  · Drink more liquids as directed  Liquids such as water help remove uric acid from your body  Ask how much liquid to drink each day and which liquids are best for you  · Manage your weight  Weight loss may decrease the amount of uric acid in your body  Exercise can help you lose weight  Talk to your healthcare provider about the best exercises for you  · Control your blood sugar level if you have diabetes  Keep your blood sugar level in a normal range  This can help prevent gout attacks  · Limit or do not drink alcohol as directed  Alcohol can trigger a gout attack  Alcohol also increases your risk for dehydration  Ask your healthcare provider if alcohol is safe for you  Follow up with your healthcare provider as directed:  Write down your questions so you remember to ask them during your visits  © 2017 [address] Information is for End User's use only and may not be sold, redistributed or otherwise used for commercial purposes  All illustrations and images included in CareNotes® are the copyrighted property of [employer]  or Ervin Garza  The above information is an  only  It is not intended as medical advice for individual conditions or treatments  Talk to your doctor, nurse or pharmacist before following any medical regimen to see if it is safe and effective for you

## 2018-05-07 NOTE — LETTER
May 7, 2018     Patient: Sharda Jarrell   YOB: 1975   Date of Visit: 5/7/2018       To Whom it May Concern:    Angle Cason is under my professional care  He was seen in my office on 5/7/2018  He should return to work on 5/8/18  If you have any questions or concerns, please don't hesitate to call           Sincerely,          Ho Maldonado MD        CC: No Recipients

## 2018-05-07 NOTE — PROGRESS NOTES
Assessment/Plan:         Diagnoses and all orders for this visit:    Acute gouty arthropathy  -     predniSONE 20 mg tablet; Take 1 tablet (20 mg total) by mouth 2 (two) times a day with meals        Take prednisone twice a day for 5 days  I did give him a refill to take on his trip  Call if not better in 4-5 days  Subjective:      Patient ID: Sandra Norman is a 40 y o  male  He is here for right ankle swelling and pain  He tried rest, elevation, ice, ibuprofen  Today he states he could barely get his shoe on or walk on the foot  He has had gout in the past in the right toe  He states he has been following [employer] and wonders if this is what caused a flare up  He eats a lot of meat  He is going to [facility] this weekend and needs to be better for his trip  The following portions of the patient's history were reviewed and updated as appropriate:   He  has no past medical history on file  He   Patient Active Problem List    Diagnosis Date Noted    Irritant contact dermatitis due to other agents 02/15/2018    Acute gouty arthropathy 10/13/2017    Hyperlipidemia 03/31/2016     He  has no past surgical history on file  His family history is not on file  He  reports that he has never smoked  He has never used smokeless tobacco  He reports that he drinks alcohol  He reports that he does not use drugs  Current Outpatient Prescriptions   Medication Sig Dispense Refill    predniSONE 20 mg tablet Take 1 tablet (20 mg total) by mouth 2 (two) times a day with meals 10 tablet 1    triamcinolone (KENALOG) 0 1 % cream Apply topically 2 (two) times a day 30 g 0     No current facility-administered medications for this visit  Current Outpatient Prescriptions on File Prior to Visit   Medication Sig    triamcinolone (KENALOG) 0 1 % cream Apply topically 2 (two) times a day     No current facility-administered medications on file prior to visit        He is allergic to gluten meal     Review of Systems   Constitutional: Negative  Negative for fever  Musculoskeletal: Positive for arthralgias  Objective:      /88   Pulse 82   Temp 98 3 °F (36 8 °C)   Ht 5' 10" (1 778 m)   Wt 97 3 kg (214 lb 6 4 oz)   SpO2 98%   BMI 30 76 kg/m²          Physical Exam   Constitutional: He appears well-developed and well-nourished  No distress  Uncomfortable   Pulmonary/Chest: Effort normal    Musculoskeletal:        Right ankle: He exhibits swelling  He exhibits no ecchymosis  Tenderness  Neurological: He is alert  Psychiatric: He has a normal mood and affect  His behavior is normal  Judgment and thought content normal    Nursing note and vitals reviewed

## 2018-05-07 NOTE — TELEPHONE ENCOUNTER
The prednisone will help reduce the inflammation which is causing the pain    If he wants something like tramadol which is a mild narcotic I can send in a few

## 2018-05-07 NOTE — TELEPHONE ENCOUNTER
Pt was just in and he was under the impression that he was going to receive prednisone and something for pain but he only got the prednisone at [facility]

## 2018-05-11 DIAGNOSIS — M10.9 ACUTE GOUTY ARTHROPATHY: ICD-10-CM

## 2018-05-11 RX ORDER — PREDNISONE 20 MG/1
20 TABLET ORAL 2 TIMES DAILY WITH MEALS
Qty: 10 TABLET | Refills: 0 | Status: SHIPPED | OUTPATIENT
Start: 2018-05-11 | End: 2018-05-25 | Stop reason: SDUPTHER

## 2018-05-11 NOTE — TELEPHONE ENCOUNTER
Patient called and stated that his foot feels about 50 % better still having pain - he stated that you gave him prednisone to take with him on his vacation but the pharmacy is telling him that it was already filled  Can you send in another RX?

## 2018-05-25 ENCOUNTER — TELEPHONE (OUTPATIENT)
Dept: FAMILY MEDICINE CLINIC | Facility: CLINIC | Age: 43
End: 2018-05-25

## 2018-05-25 DIAGNOSIS — M10.9 ACUTE GOUTY ARTHROPATHY: ICD-10-CM

## 2018-05-25 RX ORDER — PREDNISONE 20 MG/1
TABLET ORAL
Qty: 18 TABLET | Refills: 0 | Status: SHIPPED | OUTPATIENT
Start: 2018-05-25 | End: 2018-10-24

## 2018-05-25 NOTE — TELEPHONE ENCOUNTER
Was seen for gout about 2 weeks ago and was given prenisone    Having a flare up again in the same foot and ankle  Can we give more prednisone

## 2018-05-25 NOTE — TELEPHONE ENCOUNTER
Patient called again to check status  He was wondering if you preferred to see him or if you would rather refill the prednisone RX

## 2018-09-10 DIAGNOSIS — M10.9 ACUTE GOUT OF RIGHT ANKLE, UNSPECIFIED CAUSE: ICD-10-CM

## 2018-09-10 DIAGNOSIS — M10.9 ACUTE GOUTY ARTHROPATHY: ICD-10-CM

## 2018-09-10 RX ORDER — TRAMADOL HYDROCHLORIDE 50 MG/1
50 TABLET ORAL EVERY 6 HOURS PRN
Qty: 20 TABLET | Refills: 0 | OUTPATIENT
Start: 2018-09-10

## 2018-09-10 RX ORDER — PREDNISONE 20 MG/1
TABLET ORAL
Qty: 18 TABLET | Refills: 0 | OUTPATIENT
Start: 2018-09-10

## 2018-09-10 NOTE — TELEPHONE ENCOUNTER
Has another flare up of gout now in his ankle- was given tramadol and prednisone before - can we rx them again or do you need to see him     Please call

## 2018-10-24 ENCOUNTER — HOSPITAL ENCOUNTER (EMERGENCY)
Facility: HOSPITAL | Age: 43
Discharge: HOME | End: 2018-10-24
Attending: EMERGENCY MEDICINE | Admitting: EMERGENCY MEDICINE
Payer: COMMERCIAL

## 2018-10-24 ENCOUNTER — APPOINTMENT (EMERGENCY)
Dept: RADIOLOGY | Facility: HOSPITAL | Age: 43
End: 2018-10-24
Payer: COMMERCIAL

## 2018-10-24 VITALS
OXYGEN SATURATION: 96 % | WEIGHT: 208 LBS | HEART RATE: 74 BPM | SYSTOLIC BLOOD PRESSURE: 141 MMHG | BODY MASS INDEX: 29.78 KG/M2 | TEMPERATURE: 98.3 F | RESPIRATION RATE: 19 BRPM | HEIGHT: 70 IN | DIASTOLIC BLOOD PRESSURE: 91 MMHG

## 2018-10-24 DIAGNOSIS — R07.9 CHEST PAIN, UNSPECIFIED TYPE: Primary | ICD-10-CM

## 2018-10-24 LAB
ANION GAP SERPL CALCULATED.3IONS-SCNC: 7 MMOL/L (ref 4–13)
ATRIAL RATE: 78 BPM
BASOPHILS # BLD AUTO: 0.04 THOUSANDS/ΜL (ref 0–0.1)
BASOPHILS NFR BLD AUTO: 1 % (ref 0–1)
BUN SERPL-MCNC: 12 MG/DL (ref 5–25)
CALCIUM SERPL-MCNC: 9.9 MG/DL (ref 8.3–10.1)
CHLORIDE SERPL-SCNC: 106 MMOL/L (ref 100–108)
CO2 SERPL-SCNC: 31 MMOL/L (ref 21–32)
CREAT SERPL-MCNC: 1.08 MG/DL (ref 0.6–1.3)
EOSINOPHIL # BLD AUTO: 0.06 THOUSAND/ΜL (ref 0–0.61)
EOSINOPHIL NFR BLD AUTO: 1 % (ref 0–6)
ERYTHROCYTE [DISTWIDTH] IN BLOOD BY AUTOMATED COUNT: 12.9 % (ref 11.6–15.1)
GFR SERPL CREATININE-BSD FRML MDRD: 84 ML/MIN/1.73SQ M
GLUCOSE SERPL-MCNC: 101 MG/DL (ref 65–140)
HCT VFR BLD AUTO: 46.1 % (ref 36.5–49.3)
HGB BLD-MCNC: 15.2 G/DL (ref 12–17)
IMM GRANULOCYTES # BLD AUTO: 0.01 THOUSAND/UL (ref 0–0.2)
IMM GRANULOCYTES NFR BLD AUTO: 0 % (ref 0–2)
LYMPHOCYTES # BLD AUTO: 1.27 THOUSANDS/ΜL (ref 0.6–4.47)
LYMPHOCYTES NFR BLD AUTO: 25 % (ref 14–44)
MCH RBC QN AUTO: 30.2 PG (ref 26.8–34.3)
MCHC RBC AUTO-ENTMCNC: 33 G/DL (ref 31.4–37.4)
MCV RBC AUTO: 92 FL (ref 82–98)
MONOCYTES # BLD AUTO: 0.36 THOUSAND/ΜL (ref 0.17–1.22)
MONOCYTES NFR BLD AUTO: 7 % (ref 4–12)
NEUTROPHILS # BLD AUTO: 3.27 THOUSANDS/ΜL (ref 1.85–7.62)
NEUTS SEG NFR BLD AUTO: 66 % (ref 43–75)
NRBC BLD AUTO-RTO: 0 /100 WBCS
P AXIS: 118 DEGREES
PLATELET # BLD AUTO: 262 THOUSANDS/UL (ref 149–390)
PMV BLD AUTO: 10.2 FL (ref 8.9–12.7)
POTASSIUM SERPL-SCNC: 5.2 MMOL/L (ref 3.5–5.3)
PR INTERVAL: 144 MS
QRS AXIS: 193 DEGREES
QRSD INTERVAL: 106 MS
QT INTERVAL: 398 MS
QTC INTERVAL: 453 MS
RBC # BLD AUTO: 5.03 MILLION/UL (ref 3.88–5.62)
SODIUM SERPL-SCNC: 144 MMOL/L (ref 136–145)
T WAVE AXIS: 136 DEGREES
TROPONIN I SERPL-MCNC: <0.02 NG/ML
VENTRICULAR RATE: 78 BPM
WBC # BLD AUTO: 5.01 THOUSAND/UL (ref 4.31–10.16)

## 2018-10-24 PROCEDURE — 93010 ELECTROCARDIOGRAM REPORT: CPT | Performed by: INTERNAL MEDICINE

## 2018-10-24 PROCEDURE — 99285 EMERGENCY DEPT VISIT HI MDM: CPT

## 2018-10-24 PROCEDURE — 93005 ELECTROCARDIOGRAM TRACING: CPT

## 2018-10-24 PROCEDURE — 84484 ASSAY OF TROPONIN QUANT: CPT | Performed by: NURSE PRACTITIONER

## 2018-10-24 PROCEDURE — 36415 COLL VENOUS BLD VENIPUNCTURE: CPT | Performed by: NURSE PRACTITIONER

## 2018-10-24 PROCEDURE — 80048 BASIC METABOLIC PNL TOTAL CA: CPT | Performed by: NURSE PRACTITIONER

## 2018-10-24 PROCEDURE — 85025 COMPLETE CBC W/AUTO DIFF WBC: CPT | Performed by: NURSE PRACTITIONER

## 2018-10-24 PROCEDURE — 71046 X-RAY EXAM CHEST 2 VIEWS: CPT

## 2018-10-24 PROCEDURE — 96372 THER/PROPH/DIAG INJ SC/IM: CPT

## 2018-10-24 RX ORDER — NAPROXEN 500 MG/1
500 TABLET ORAL 2 TIMES DAILY WITH MEALS
Qty: 10 TABLET | Refills: 0 | Status: SHIPPED | OUTPATIENT
Start: 2018-10-24 | End: 2019-04-12

## 2018-10-24 RX ORDER — ACETAMINOPHEN 325 MG/1
975 TABLET ORAL ONCE
Status: COMPLETED | OUTPATIENT
Start: 2018-10-24 | End: 2018-10-24

## 2018-10-24 RX ORDER — KETOROLAC TROMETHAMINE 30 MG/ML
30 INJECTION, SOLUTION INTRAMUSCULAR; INTRAVENOUS ONCE
Status: COMPLETED | OUTPATIENT
Start: 2018-10-24 | End: 2018-10-24

## 2018-10-24 RX ADMIN — ACETAMINOPHEN 975 MG: 325 TABLET, FILM COATED ORAL at 15:03

## 2018-10-24 RX ADMIN — KETOROLAC TROMETHAMINE 30 MG: 30 INJECTION, SOLUTION INTRAMUSCULAR at 15:00

## 2018-10-24 NOTE — ED PROVIDER NOTES
History  Chief Complaint   Patient presents with    Chest Pain     pt c/o cp and pressure since this morning  28-year-old male patient presents here with chief complaint of chest pressure since this morning  He has had episodes like this in the past and reports that previously he was told it was anxiety  It seems that his pain is at the peak of inspiration then goes away  There is no other associated diaphoresis no nausea no vomiting  No pain in his back no radiation of the discomfort seems to be directly over the left chest area and somewhat reproducible with inspiration and manipulation of the torso  None       History reviewed  No pertinent past medical history  History reviewed  No pertinent surgical history  Family History   Problem Relation Age of Onset    Ovarian cancer Mother      I have reviewed and agree with the history as documented  Social History   Substance Use Topics    Smoking status: Light Tobacco Smoker     Types: Cigars    Smokeless tobacco: Never Used    Alcohol use Yes      Comment: social        Review of Systems   Constitutional: Negative for diaphoresis, fatigue and fever  HENT: Negative for congestion, ear pain, nosebleeds and sore throat  Eyes: Negative for photophobia, pain, discharge and visual disturbance  Respiratory: Negative for cough, choking, chest tightness, shortness of breath and wheezing  Cardiovascular: Positive for chest pain  Negative for palpitations  Gastrointestinal: Negative for abdominal distention, abdominal pain, diarrhea and vomiting  Genitourinary: Negative for dysuria, flank pain and frequency  Musculoskeletal: Negative for back pain, gait problem and joint swelling  Skin: Negative for color change and rash  Neurological: Negative for dizziness, syncope and headaches  Psychiatric/Behavioral: Negative for behavioral problems and confusion  The patient is not nervous/anxious      All other systems reviewed and are negative  Physical Exam  Physical Exam   Constitutional: He is oriented to person, place, and time  He appears well-developed and well-nourished  HENT:   Head: Normocephalic and atraumatic  Eyes: Pupils are equal, round, and reactive to light  Neck: Normal range of motion  Neck supple  Cardiovascular: Normal rate, regular rhythm, normal heart sounds and normal pulses  PMI is not displaced  Pulmonary/Chest: Effort normal and breath sounds normal  No respiratory distress  Abdominal: Soft  He exhibits no distension  There is no guarding  Musculoskeletal: Normal range of motion  Lymphadenopathy:     He has no cervical adenopathy  Neurological: He is alert and oriented to person, place, and time  Skin: Skin is warm and dry  No rash noted  He is not diaphoretic  No pallor  Psychiatric: He has a normal mood and affect  Vitals reviewed        Vital Signs  ED Triage Vitals [10/24/18 1344]   Temperature Pulse Respirations Blood Pressure SpO2   98 3 °F (36 8 °C) 88 20 139/90 99 %      Temp Source Heart Rate Source Patient Position - Orthostatic VS BP Location FiO2 (%)   Oral Monitor Sitting Left arm --      Pain Score       5           Vitals:    10/24/18 1344   BP: 139/90   Pulse: 88   Patient Position - Orthostatic VS: Sitting       Visual Acuity      ED Medications  Medications   ketorolac (TORADOL) injection 30 mg (30 mg Intramuscular Given 10/24/18 1500)   acetaminophen (TYLENOL) tablet 975 mg (975 mg Oral Given 10/24/18 1503)       Diagnostic Studies  Results Reviewed     Procedure Component Value Units Date/Time    Troponin I [[id number]]  (Normal) Collected:  10/24/18 1459    Lab Status:  Final result Specimen:  Blood from Arm, Right Updated:  10/24/18 1528     Troponin I <0 02 ng/mL     Basic metabolic panel [[id number]] Collected:  10/24/18 1459    Lab Status:  Final result Specimen:  Blood from Arm, Right Updated:  10/24/18 1523     Sodium 144 mmol/L      Potassium 5 2 mmol/L Chloride 106 mmol/L      CO2 31 mmol/L      ANION GAP 7 mmol/L      BUN 12 mg/dL      Creatinine 1 08 mg/dL      Glucose 101 mg/dL      Calcium 9 9 mg/dL      eGFR 84 ml/min/1 73sq m     Narrative:         National Kidney Disease Education Program recommendations are as follows:  GFR calculation is accurate only with a steady state creatinine  Chronic Kidney disease less than 60 ml/min/1 73 sq  meters  Kidney failure less than 15 ml/min/1 73 sq  meters      CBC and differential [[id number]] Collected:  10/24/18 1459    Lab Status:  Final result Specimen:  Blood from Arm, Right Updated:  10/24/18 1509     WBC 5 01 Thousand/uL      RBC 5 03 Million/uL      Hemoglobin 15 2 g/dL      Hematocrit 46 1 %      MCV 92 fL      MCH 30 2 pg      MCHC 33 0 g/dL      RDW 12 9 %      MPV 10 2 fL      Platelets 956 Thousands/uL      nRBC 0 /100 WBCs      Neutrophils Relative 66 %      Immat GRANS % 0 %      Lymphocytes Relative 25 %      Monocytes Relative 7 %      Eosinophils Relative 1 %      Basophils Relative 1 %      Neutrophils Absolute 3 27 Thousands/µL      Immature Grans Absolute 0 01 Thousand/uL      Lymphocytes Absolute 1 27 Thousands/µL      Monocytes Absolute 0 36 Thousand/µL      Eosinophils Absolute 0 06 Thousand/µL      Basophils Absolute 0 04 Thousands/µL                  XR chest 2 views    (Results Pending)              Procedures  ECG 12 Lead Documentation  Date/Time: 10/24/2018 2:54 PM  Performed by: Darwin Nolan by: Stuart Oro     ECG reviewed by me, the ED Provider: yes    Patient location:  ED  Previous ECG:     Previous ECG:  Unavailable  Interpretation:     Interpretation: normal    Rate:     ECG rate:  78    ECG rate assessment: normal    Rhythm:     Rhythm: sinus rhythm    Ectopy:     Ectopy: none    QRS:     QRS axis:  Normal  Conduction:     Conduction: abnormal      Abnormal conduction: incomplete RBBB    ST segments:     ST segments:  Normal  T waves:     T waves: normal Phone Contacts  ED Phone Contact    ED Course         HEART Risk Score      Most Recent Value   History  0 Filed at: 10/24/2018 1453   ECG  0 Filed at: 10/24/2018 1453   Age  0 Filed at: 10/24/2018 1453   Risk Factors  1 Filed at: 10/24/2018 1453   Troponin  0 Filed at: 10/24/2018 1453   Heart Score Risk Calculator   History  0 Filed at: 10/24/2018 1453   ECG  0 Filed at: 10/24/2018 1453   Age  0 Filed at: 10/24/2018 1453   Risk Factors  1 Filed at: 10/24/2018 1453   Troponin  0 Filed at: 10/24/2018 1453   HEART Score  1 Filed at: 10/24/2018 1453   HEART Score  1 Filed at: 10/24/2018 1453                            MDM  Number of Diagnoses or Management Options  Chest pain, unspecified type: new and requires workup  Diagnosis management comments: Chest pain most likely muscular in nature  Will treat with anti-inflammatories  Return if worsening or not improving or follow up with PCP  Amount and/or Complexity of Data Reviewed  Clinical lab tests: ordered and reviewed  Tests in the radiology section of CPT®: reviewed and ordered  Tests in the medicine section of CPT®: reviewed and ordered  Independent visualization of images, tracings, or specimens: yes      CritCare Time    Disposition  Final diagnoses:   Chest pain, unspecified type     Time reflects when diagnosis was documented in both MDM as applicable and the Disposition within this note     Time User Action Codes Description Comment    10/24/2018  2:54 PM Huong Hayes Add [R07 9] Chest pain, unspecified type       ED Disposition     None      Follow-up Information     Follow up With Specialties Details Why [address] [place] [address] MD [facility]  For Continued Evaluation [address] [facility],[address]  [address] Emergency Department Emergency Medicine  If your symptoms worsen, or you are not improving   [facility]  [phone] [place] [facility], [address], [place], [address]          Patient's Medications   Discharge Prescriptions    NAPROXEN (NAPROSYN) 500 MG TABLET    Take 1 tablet (500 mg total) by mouth 2 (two) times a day with meals for 5 days       Start Date: 10/24/2018End Date: 10/29/2018       Order Dose: 500 mg       Quantity: 10 tablet    Refills: 0     No discharge procedures on file      ED Provider  Electronically Signed by           BRITTNI Pang  10/24/18 6706

## 2018-10-24 NOTE — DISCHARGE INSTRUCTIONS

## 2018-10-25 ENCOUNTER — OFFICE VISIT (OUTPATIENT)
Dept: FAMILY MEDICINE CLINIC | Facility: CLINIC | Age: 43
End: 2018-10-25
Payer: COMMERCIAL

## 2018-10-25 VITALS
HEIGHT: 70 IN | SYSTOLIC BLOOD PRESSURE: 130 MMHG | TEMPERATURE: 98.2 F | RESPIRATION RATE: 16 BRPM | OXYGEN SATURATION: 97 % | BODY MASS INDEX: 30.84 KG/M2 | DIASTOLIC BLOOD PRESSURE: 90 MMHG | HEART RATE: 84 BPM | WEIGHT: 215.4 LBS

## 2018-10-25 DIAGNOSIS — Z13.1 DIABETES MELLITUS SCREENING: ICD-10-CM

## 2018-10-25 DIAGNOSIS — R03.0 ELEVATED BLOOD PRESSURE READING WITHOUT DIAGNOSIS OF HYPERTENSION: Primary | ICD-10-CM

## 2018-10-25 DIAGNOSIS — R07.9 CHEST PAIN, UNSPECIFIED TYPE: ICD-10-CM

## 2018-10-25 DIAGNOSIS — Z13.220 NEED FOR LIPID SCREENING: ICD-10-CM

## 2018-10-25 PROCEDURE — 99214 OFFICE O/P EST MOD 30 MIN: CPT | Performed by: FAMILY MEDICINE

## 2018-10-25 NOTE — PROGRESS NOTES
Assessment/Plan:    No problem-specific Assessment & Plan notes found for this encounter  Diagnoses and all orders for this visit:    Elevated blood pressure reading without diagnosis of hypertension  He was advised to measure his blood pressure daily in the evening if elevated above 140/90 mmHg on 3 separate occasions consider medications  Counseled in regards to lifestyle modifications  Chest pain, unspecified type  Referred to cardiology for evaluation blood work ordered to look for risks factors such as diabetes and cholesterol  Diabetes mellitus screening  -     Comprehensive metabolic panel; Future  -     Hemoglobin A1C; Future    Need for lipid screening  -     Lipid panel; Future      Follow up in 2 weeks    Subjective:      Patient ID: Alexandru Vogt is a 44 y o  male  Patient is here to follow-up for an ER visit yesterday at [address] [facility] due to chest pain  He was walking his dog early in the morning and felt some chest discomfort  He denies any nausea vomiting associated with his chest discomfort  He denies any radiation of the pain  He ended up in emergency room and was evaluated with an EKG and troponin both were negative for ischemia at that point  He denies any chest discomfort or pain at this time  He had pleurisy associated with his symptoms  He denies any history of asthma or COPD  He is not a smoker  He smokes occasional cigars on the weekends  He does have a family history of hypertension  Does not take any medications  His cholesterol was found to be elevated last year per blood work  The following portions of the patient's history were reviewed and updated as appropriate:   He  has no past medical history on file    He   Patient Active Problem List    Diagnosis Date Noted    Elevated blood pressure reading without diagnosis of hypertension 10/25/2018    Chest pain 10/25/2018    Diabetes mellitus screening 10/25/2018    Need for lipid screening 10/25/2018    Irritant contact dermatitis due to other agents 02/15/2018    Acute gouty arthropathy 10/13/2017    Hyperlipidemia 03/31/2016     He  has no past surgical history on file  His family history includes Ovarian cancer in his mother  He  reports that he has been smoking Cigars  He has never used smokeless tobacco  He reports that he drinks alcohol  He reports that he does not use drugs  Current Outpatient Prescriptions   Medication Sig Dispense Refill    naproxen (NAPROSYN) 500 mg tablet Take 1 tablet (500 mg total) by mouth 2 (two) times a day with meals for 5 days (Patient not taking: Reported on 10/25/2018 ) 10 tablet 0     No current facility-administered medications for this visit  Current Outpatient Prescriptions on File Prior to Visit   Medication Sig    naproxen (NAPROSYN) 500 mg tablet Take 1 tablet (500 mg total) by mouth 2 (two) times a day with meals for 5 days (Patient not taking: Reported on 10/25/2018 )     Current Facility-Administered Medications on File Prior to Visit   Medication    [COMPLETED] acetaminophen (TYLENOL) tablet 975 mg    [COMPLETED] ketorolac (TORADOL) injection 30 mg     He is allergic to gluten meal     Review of Systems   Constitutional: Negative for activity change, appetite change, fatigue and fever  HENT: Negative for congestion and ear discharge  Respiratory: Negative for cough and shortness of breath  Cardiovascular: Negative for chest pain and palpitations  Gastrointestinal: Negative for diarrhea and nausea  Musculoskeletal: Negative for arthralgias and back pain  Skin: Negative for color change and rash  Neurological: Negative for dizziness and headaches  Psychiatric/Behavioral: Negative for agitation and behavioral problems           Objective:      /90 (BP Location: Left arm, Patient Position: Sitting, Cuff Size: Adult)   Pulse 84   Temp 98 2 °F (36 8 °C) (Tympanic)   Resp 16   Ht 5' 10" (1 778 m)   Wt 97 7 kg (215 lb 6 4 oz)   SpO2 97%   BMI 30 91 kg/m²          Physical Exam   Constitutional: He is oriented to person, place, and time  He appears well-developed and well-nourished  No distress  Eyes: Pupils are equal, round, and reactive to light  No scleral icterus  Cardiovascular: Normal rate, regular rhythm and normal heart sounds  No murmur heard  Pulmonary/Chest: Effort normal and breath sounds normal  No respiratory distress  He has no wheezes  Abdominal: Soft  Bowel sounds are normal  He exhibits no distension  There is no tenderness  Neurological: He is alert and oriented to person, place, and time  Skin: Skin is warm and dry  No rash noted  He is not diaphoretic  Psychiatric: He has a normal mood and affect

## 2018-10-26 ENCOUNTER — OFFICE VISIT (OUTPATIENT)
Dept: CARDIOLOGY CLINIC | Facility: CLINIC | Age: 43
End: 2018-10-26
Payer: COMMERCIAL

## 2018-10-26 ENCOUNTER — APPOINTMENT (OUTPATIENT)
Dept: LAB | Facility: CLINIC | Age: 43
End: 2018-10-26
Payer: COMMERCIAL

## 2018-10-26 VITALS
HEIGHT: 70 IN | HEART RATE: 68 BPM | SYSTOLIC BLOOD PRESSURE: 126 MMHG | BODY MASS INDEX: 30.78 KG/M2 | WEIGHT: 215 LBS | DIASTOLIC BLOOD PRESSURE: 84 MMHG

## 2018-10-26 DIAGNOSIS — R07.9 CHEST PAIN, UNSPECIFIED TYPE: Primary | ICD-10-CM

## 2018-10-26 DIAGNOSIS — Z13.1 DIABETES MELLITUS SCREENING: ICD-10-CM

## 2018-10-26 DIAGNOSIS — R03.0 ELEVATED BLOOD PRESSURE READING WITHOUT DIAGNOSIS OF HYPERTENSION: ICD-10-CM

## 2018-10-26 DIAGNOSIS — E78.2 MIXED HYPERLIPIDEMIA: ICD-10-CM

## 2018-10-26 DIAGNOSIS — Z13.220 NEED FOR LIPID SCREENING: ICD-10-CM

## 2018-10-26 LAB
ALBUMIN SERPL BCP-MCNC: 4 G/DL (ref 3.5–5)
ALP SERPL-CCNC: 67 U/L (ref 46–116)
ALT SERPL W P-5'-P-CCNC: 40 U/L (ref 12–78)
ANION GAP SERPL CALCULATED.3IONS-SCNC: 7 MMOL/L (ref 4–13)
AST SERPL W P-5'-P-CCNC: 25 U/L (ref 5–45)
BILIRUB SERPL-MCNC: 0.54 MG/DL (ref 0.2–1)
BUN SERPL-MCNC: 16 MG/DL (ref 5–25)
CALCIUM SERPL-MCNC: 9.4 MG/DL (ref 8.3–10.1)
CHLORIDE SERPL-SCNC: 108 MMOL/L (ref 100–108)
CHOLEST SERPL-MCNC: 228 MG/DL (ref 50–200)
CO2 SERPL-SCNC: 27 MMOL/L (ref 21–32)
CREAT SERPL-MCNC: 1.24 MG/DL (ref 0.6–1.3)
EST. AVERAGE GLUCOSE BLD GHB EST-MCNC: 103 MG/DL
GFR SERPL CREATININE-BSD FRML MDRD: 71 ML/MIN/1.73SQ M
GLUCOSE P FAST SERPL-MCNC: 88 MG/DL (ref 65–99)
HBA1C MFR BLD: 5.2 % (ref 4.2–6.3)
HDLC SERPL-MCNC: 40 MG/DL (ref 40–60)
LDLC SERPL CALC-MCNC: 128 MG/DL (ref 0–100)
NONHDLC SERPL-MCNC: 188 MG/DL
POTASSIUM SERPL-SCNC: 4.9 MMOL/L (ref 3.5–5.3)
PROT SERPL-MCNC: 7.6 G/DL (ref 6.4–8.2)
SODIUM SERPL-SCNC: 142 MMOL/L (ref 136–145)
TRIGL SERPL-MCNC: 301 MG/DL

## 2018-10-26 PROCEDURE — 36415 COLL VENOUS BLD VENIPUNCTURE: CPT

## 2018-10-26 PROCEDURE — 80053 COMPREHEN METABOLIC PANEL: CPT

## 2018-10-26 PROCEDURE — 83036 HEMOGLOBIN GLYCOSYLATED A1C: CPT

## 2018-10-26 PROCEDURE — 80061 LIPID PANEL: CPT

## 2018-10-26 PROCEDURE — 99244 OFF/OP CNSLTJ NEW/EST MOD 40: CPT | Performed by: INTERNAL MEDICINE

## 2018-10-26 NOTE — PROGRESS NOTES
Subjective:        Patient ID: Geno Churchill is a 44 y o  male  Chief Complaint:  Kelly Pastrana is here for cardiac consultation your kind request   Yoel Naranjo he has had 2 episodes now in the last year of pleuritic-type chest pain  Once he felt chest heaviness about 8 months ago worsened by deep inspiration ER workup was negative  Recently, reviewed ER records, he had chest pain with deep inspiration again but it lasted most of the day every time he took a deep breath so this concerned him and he went to the emergency room for evaluation  EKG reviewed and was was nonischemic, cardiac enzymes were negative, chest x-ray unrevealing  No one was ill at home recently  He has been telling me that for years he will watch borderline hypertensive readings  He admits to daytime hypersomnolence, excessive snoring, and a partner that has been telling him that he stops breathing at night with apneic periods witnessed  He does not eat much salt, he does not take any NSAIDs  He had any been taking naproxen prescribed by the E [facility]  Pain has resolved on its own  No travel, no recent tick bites or rashes  Family history fairly strong for hypertension not significant for premature CAD or sudden death  No history rheumatic fever prior cardiac disease diagnosed  The following portions of the patient's history were reviewed and updated as appropriate: allergies, current medications, past family history, past medical history, past social history, past surgical history and problem list   Review of Systems   Constitution: Negative for chills, diaphoresis, malaise/fatigue and weight gain  HENT: Negative for nosebleeds and stridor  Eyes: Negative for double vision, vision loss in left eye, vision loss in right eye and visual disturbance  Cardiovascular: Positive for chest pain   Negative for claudication, cyanosis, dyspnea on exertion, irregular heartbeat, leg swelling, near-syncope, orthopnea, palpitations, paroxysmal nocturnal dyspnea and syncope  Respiratory: Negative for cough, shortness of breath, snoring and wheezing  Endocrine: Negative for polydipsia, polyphagia and polyuria  Hematologic/Lymphatic: Negative for bleeding problem  Does not bruise/bleed easily  Skin: Negative for flushing and rash  Musculoskeletal: Negative for falls and myalgias  Gastrointestinal: Negative for abdominal pain, heartburn, hematemesis, hematochezia, melena and nausea  Genitourinary: Negative for hematuria  Neurological: Negative for brief paralysis, dizziness, focal weakness, headaches, light-headedness, loss of balance and vertigo  Psychiatric/Behavioral: Negative for altered mental status and substance abuse  Allergic/Immunologic: Negative for hives  Objective:     Physical Exam   Constitutional: He is oriented to person, place, and time  He appears well-developed and well-nourished  No distress  HENT:   Head: Normocephalic and atraumatic  Eyes: Pupils are equal, round, and reactive to light  EOM are normal  No scleral icterus  Neck: Normal range of motion  Neck supple  No JVD present  No thyromegaly present  Cardiovascular: Normal rate, regular rhythm and normal heart sounds  Exam reveals no gallop and no friction rub  No murmur heard  Pulmonary/Chest: Effort normal and breath sounds normal  No stridor  No respiratory distress  He has no wheezes  He has no rales  Abdominal: Soft  Bowel sounds are normal  He exhibits no distension and no mass  There is no tenderness  Musculoskeletal: Normal range of motion  He exhibits no edema or deformity  Neurological: He is alert and oriented to person, place, and time  Coordination normal    Skin: Skin is warm and dry  No erythema  No pallor  Psychiatric: He has a normal mood and affect   His behavior is normal        Lab Review:   Admission on 10/24/2018, Discharged on 10/24/2018   Component Date Value    WBC 10/24/2018 5 01     RBC 10/24/2018 5 03     Hemoglobin 10/24/2018 15 2     Hematocrit 10/24/2018 46 1     MCV 10/24/2018 92     MCH 10/24/2018 30 2     MCHC 10/24/2018 33 0     RDW 10/24/2018 12 9     MPV 10/24/2018 10 2     Platelets 27/08/7076 262     nRBC 10/24/2018 0     Neutrophils Relative 10/24/2018 66     Immat GRANS % 10/24/2018 0     Lymphocytes Relative 10/24/2018 25     Monocytes Relative 10/24/2018 7     Eosinophils Relative 10/24/2018 1     Basophils Relative 10/24/2018 1     Neutrophils Absolute 10/24/2018 3 27     Immature Grans Absolute 10/24/2018 0 01     Lymphocytes Absolute 10/24/2018 1 27     Monocytes Absolute 10/24/2018 0 36     Eosinophils Absolute 10/24/2018 0 06     Basophils Absolute 10/24/2018 0 04     Sodium 10/24/2018 144     Potassium 10/24/2018 5 2     Chloride 10/24/2018 106     CO2 10/24/2018 31     ANION GAP 10/24/2018 7     BUN 10/24/2018 12     Creatinine 10/24/2018 1 08     Glucose 10/24/2018 101     Calcium 10/24/2018 9 9     eGFR 10/24/2018 84     Troponin I 10/24/2018 <0 02     Ventricular Rate 10/24/2018 78     Atrial Rate 10/24/2018 78     TN Interval 10/24/2018 144     QRSD Interval 10/24/2018 106     QT Interval 10/24/2018 398     QTC Interval 10/24/2018 453     P Axis 10/24/2018 118     QRS Axis 10/24/2018 193     T Wave Axis 10/24/2018 136          Assessment:       1  Chest pain, unspecified type  Ambulatory referral to Cardiology    Echo stress test w contrast if indicated   2  Mixed hyperlipidemia     3  Elevated blood pressure reading without diagnosis of hypertension          Plan:       His Chest pain is more pleuritic likely musculoskeletal in etiology  No trauma history  There were no significant clinical findings consistent with pericarditis  He does have a few cardiac risk factors and for peace of mind since this has now recurred I would advocate ruling out any coronary insufficiency albeit unlikely    Exercise stress echocardiogram ordered to rule out coronary insufficiency as well to make sure there is no pericardial process going on though his mediastinal silhouette was normal in his recent chest x-ray making this unlikely as well  This will also let us look at his blood pressure, and LV mass, particularly with exertion though I do not think this is playing a role in his chest pain syndrome  As long as the stress test is negative I saw return him to your care  I would give strong consideration to a sleep apnea workup, this may be a cause of hypertension as you well know  If he does not have sleep apnea I would then consider being proactive at treating his hypertension  I will leave this in your capable hands  Should you need any assistance here or with lipid management I would be glad to assist   I generally choose statins in light of their morbidity mortality reduction benefit  Thanks for sending him this way

## 2018-10-26 NOTE — PATIENT INSTRUCTIONS
Chest Pain   AMBULATORY CARE:   Chest pain  can be caused by a range of conditions, from not serious to life-threatening  It may be caused by a heart attack or a blood clot in your lungs  Sometimes chest pain or pressure is caused by poor blood flow to your heart (angina)  Infection, inflammation, or a fracture in the bones or cartilage in your chest can cause pain or discomfort  Chest pain can also be a symptom of a digestive problem, such as acid reflux or a stomach ulcer  An anxiety attack or a strong emotion such as anger can also cause chest pain  It is important to follow up with your healthcare provider to find the cause of your chest pain  Common symptoms you may have with chest pain:   · Fever or sweating     · Nausea or vomiting     · Shortness of breath     · Discomfort or pressure that spreads from your chest to your back, jaw, or arm     · A racing or slow heartbeat     · Feeling weak, tired, or faint  Call 911 if:   · You have any of the following signs of a heart attack:      ¨ Squeezing, pressure, or pain in your chest that lasts longer than 5 minutes or returns    ¨ Discomfort or pain in your back, neck, jaw, stomach, or arm     ¨ Trouble breathing    ¨ Nausea or vomiting    ¨ Lightheadedness or a sudden cold sweat, especially with chest pain or trouble breathing    Seek care immediately if:   · You have chest discomfort that gets worse, even with medicine  · You cough or vomit blood  · Your bowel movements are black or bloody  · You cannot stop vomiting, or it hurts to swallow  Contact your healthcare provider if:   · You have questions or concerns about your condition or care  Treatment for chest pain  may include medicine to treat your symptoms while your healthcare provider finds the cause of your chest pain  · Medicines  may be given to treat the cause of your chest pain  Examples include pain medicine, anxiety medicine, or medicines to increase blood flow to your heart  · Do not take certain medicines without asking your healthcare provider first   These include NSAIDs, herbal or vitamin supplements, or hormones (estrogen or progestin)  Follow up with your healthcare provider within 72 hours, or as directed: You may need to return for more tests to find the cause of your chest pain  You may be referred to a specialist, such as a cardiologist or gastroenterologist  Write down your questions so you remember to ask them during your visits  Healthy living tips: The following are general healthy guidelines  If your chest pain is caused by a heart problem, your healthcare provider will give you specific guidelines to follow  · Do not smoke  Nicotine and other chemicals in cigarettes and cigars can cause lung and heart damage  Ask your healthcare provider for information if you currently smoke and need help to quit  E-cigarettes or smokeless tobacco still contain nicotine  Talk to your healthcare provider before you use these products  · Eat a variety of healthy, low-fat foods  Healthy foods include fruits, vegetables, whole-grain breads, low-fat dairy products, beans, lean meats, and fish  Ask for more information about a heart healthy diet  · Ask about activity  Your healthcare provider will tell you which activities to limit or avoid  Ask when you can drive, return to work, and have sex  Ask about the best exercise plan for you  · Maintain a healthy weight  Ask your healthcare provider how much you should weigh  Ask him or her to help you create a weight loss plan if you are overweight  · Get the flu and pneumonia vaccines  All adults should get the influenza (flu) vaccine  Get it every year as soon as it becomes available  The pneumococcal vaccine is given to adults aged 72 years or older  The vaccine is given every 5 years to prevent pneumococcal disease, such as pneumonia    © 2017 [employer] Information is for End User's use only and may not be sold, redistributed or otherwise used for commercial purposes  All illustrations and images included in CareNotes® are the copyrighted property of A D A M , Inc  or Ervin Garza  The above information is an  only  It is not intended as medical advice for individual conditions or treatments  Talk to your doctor, nurse or pharmacist before following any medical regimen to see if it is safe and effective for you

## 2018-10-29 ENCOUNTER — HOSPITAL ENCOUNTER (OUTPATIENT)
Dept: NON INVASIVE DIAGNOSTICS | Facility: CLINIC | Age: 43
Discharge: HOME | End: 2018-10-29
Payer: COMMERCIAL

## 2018-10-29 DIAGNOSIS — R07.9 CHEST PAIN, UNSPECIFIED TYPE: ICD-10-CM

## 2018-10-29 LAB
CHEST PAIN STATEMENT: NORMAL
MAX DIASTOLIC BP: 80 MMHG
MAX HEART RATE: 173 BPM
MAX PREDICTED HEART RATE: 178 BPM
MAX. SYSTOLIC BP: 190 MMHG
PROTOCOL NAME: NORMAL
REASON FOR TERMINATION: NORMAL
TARGET HR FORMULA: NORMAL
TEST INDICATION: NORMAL
TIME IN EXERCISE PHASE: NORMAL

## 2018-10-29 PROCEDURE — 93350 STRESS TTE ONLY: CPT | Performed by: INTERNAL MEDICINE

## 2018-10-29 PROCEDURE — 93018 CV STRESS TEST I&R ONLY: CPT | Performed by: INTERNAL MEDICINE

## 2018-10-29 PROCEDURE — 93016 CV STRESS TEST SUPVJ ONLY: CPT | Performed by: INTERNAL MEDICINE

## 2018-10-29 PROCEDURE — 93350 STRESS TTE ONLY: CPT

## 2018-11-08 ENCOUNTER — TRANSCRIBE ORDERS (OUTPATIENT)
Dept: ADMINISTRATIVE | Facility: HOSPITAL | Age: 43
End: 2018-11-08

## 2018-11-08 ENCOUNTER — OFFICE VISIT (OUTPATIENT)
Dept: FAMILY MEDICINE CLINIC | Facility: CLINIC | Age: 43
End: 2018-11-08
Payer: COMMERCIAL

## 2018-11-08 VITALS
BODY MASS INDEX: 31.01 KG/M2 | TEMPERATURE: 98 F | OXYGEN SATURATION: 98 % | RESPIRATION RATE: 16 BRPM | HEART RATE: 86 BPM | SYSTOLIC BLOOD PRESSURE: 132 MMHG | DIASTOLIC BLOOD PRESSURE: 86 MMHG | HEIGHT: 70 IN | WEIGHT: 216.6 LBS

## 2018-11-08 DIAGNOSIS — E78.5 DYSLIPIDEMIA: ICD-10-CM

## 2018-11-08 DIAGNOSIS — G47.19 EXCESSIVE DAYTIME SLEEPINESS: Primary | ICD-10-CM

## 2018-11-08 DIAGNOSIS — R06.83 SNORING: ICD-10-CM

## 2018-11-08 DIAGNOSIS — R03.0 ELEVATED BLOOD PRESSURE READING WITHOUT DIAGNOSIS OF HYPERTENSION: Primary | ICD-10-CM

## 2018-11-08 DIAGNOSIS — G47.30 SLEEP APNEA, UNSPECIFIED TYPE: ICD-10-CM

## 2018-11-08 DIAGNOSIS — G47.19 EXCESSIVE DAYTIME SLEEPINESS: ICD-10-CM

## 2018-11-08 PROCEDURE — 99214 OFFICE O/P EST MOD 30 MIN: CPT | Performed by: FAMILY MEDICINE

## 2018-11-08 NOTE — PROGRESS NOTES
Assessment/Plan:    No problem-specific Assessment & Plan notes found for this encounter  Diagnoses and all orders for this visit:    Elevated blood pressure reading without diagnosis of hypertension  Normal at this time  I discussed with him to continue lifestyle modifications such as decreasing salt intake as well as weight loss  Excessive daytime sleepiness  -     Ambulatory referral to Pulmonology; Future    Snoring  -     Ambulatory referral to Pulmonology; Future    Dyslipidemia  After discussing risks and benefits along with side effects of medication  He was advised to take fish oil daily 3-4 capsules daily consider fenofibrate if triglycerides do not improve  Follow up in 3 months        Subjective:      Patient ID: Hemanth Cates is a 44 y o  male  Patient is here to follow-up for elevated blood pressure without diagnosis of hypertension at this time  He said that his blood pressure has been running below 140/90 mm of mercury at home  His average blood pressure at home has been 120/80 mm of mercury  He denies any symptoms at this time such as headache or changes in vision  He is also here to follow-up for his blood work which showed normal blood glucose levels  He did show however elevated cholesterol above 200 mg/dl as well as elevated triglycerides 301 mg/dL  His LDL was 128 decreased from previous blood work in the past   He denies any family history of high cholesterol  He is also here to follow-up for snoring and excessive daytime sleepiness that has been going on for several weeks  Per patient's wife he does have some episodes of sleep apnea times that she has witnessed at times  He denies any other complaints  The following portions of the patient's history were reviewed and updated as appropriate:   He  has no past medical history on file    He   Patient Active Problem List    Diagnosis Date Noted    Dyslipidemia 11/08/2018    Excessive daytime sleepiness 11/08/2018    Snoring 11/08/2018    Elevated blood pressure reading without diagnosis of hypertension 10/25/2018    Chest pain 10/25/2018    Diabetes mellitus screening 10/25/2018    Need for lipid screening 10/25/2018    Irritant contact dermatitis due to other agents 02/15/2018    Acute gouty arthropathy 10/13/2017    Hyperlipidemia 03/31/2016     He  has no past surgical history on file  His family history includes Ovarian cancer in his mother  He  reports that he has been smoking Cigars  He has never used smokeless tobacco  He reports that he drinks alcohol  He reports that he does not use drugs  Current Outpatient Prescriptions   Medication Sig Dispense Refill    naproxen (NAPROSYN) 500 mg tablet Take 1 tablet (500 mg total) by mouth 2 (two) times a day with meals for 5 days (Patient not taking: Reported on 10/25/2018 ) 10 tablet 0     No current facility-administered medications for this visit  Current Outpatient Prescriptions on File Prior to Visit   Medication Sig    naproxen (NAPROSYN) 500 mg tablet Take 1 tablet (500 mg total) by mouth 2 (two) times a day with meals for 5 days (Patient not taking: Reported on 10/25/2018 )     No current facility-administered medications on file prior to visit  He is allergic to gluten meal     Review of Systems   Constitutional: Negative for activity change, appetite change, fatigue and fever  HENT: Negative for congestion and ear discharge  Respiratory: Negative for cough and shortness of breath  Cardiovascular: Negative for chest pain and palpitations  Gastrointestinal: Negative for diarrhea and nausea  Musculoskeletal: Negative for arthralgias and back pain  Skin: Negative for color change and rash  Neurological: Negative for dizziness and headaches  Psychiatric/Behavioral: Positive for sleep disturbance  Negative for agitation and behavioral problems           Objective:      /86 (BP Location: Left arm, Patient Position: Sitting, Cuff Size: Adult)   Pulse 86   Temp 98 °F (36 7 °C) (Tympanic)   Resp 16   Ht 5' 10" (1 778 m)   Wt 98 2 kg (216 lb 9 6 oz)   SpO2 98%   BMI 31 08 kg/m²          Physical Exam   Constitutional: He is oriented to person, place, and time  He appears well-developed and well-nourished  No distress  Eyes: Pupils are equal, round, and reactive to light  No scleral icterus  Cardiovascular: Normal rate, regular rhythm and normal heart sounds  No murmur heard  Pulmonary/Chest: Effort normal and breath sounds normal  No respiratory distress  He has no wheezes  Abdominal: Soft  Bowel sounds are normal  He exhibits no distension  There is no tenderness  Neurological: He is alert and oriented to person, place, and time  Skin: Skin is warm and dry  No rash noted  He is not diaphoretic  Psychiatric: He has a normal mood and affect

## 2018-12-19 ENCOUNTER — OFFICE VISIT (OUTPATIENT)
Dept: SLEEP CENTER | Facility: CLINIC | Age: 43
End: 2018-12-19
Payer: COMMERCIAL

## 2018-12-19 VITALS
WEIGHT: 219 LBS | SYSTOLIC BLOOD PRESSURE: 134 MMHG | HEIGHT: 70 IN | HEART RATE: 78 BPM | DIASTOLIC BLOOD PRESSURE: 82 MMHG | BODY MASS INDEX: 31.35 KG/M2

## 2018-12-19 DIAGNOSIS — G47.19 EXCESSIVE DAYTIME SLEEPINESS: Primary | ICD-10-CM

## 2018-12-19 DIAGNOSIS — G47.09 OTHER INSOMNIA: ICD-10-CM

## 2018-12-19 DIAGNOSIS — R06.83 SNORING: ICD-10-CM

## 2018-12-19 DIAGNOSIS — R06.81 WITNESSED EPISODE OF APNEA: ICD-10-CM

## 2018-12-19 DIAGNOSIS — E66.9 OBESITY (BMI 30-39.9): ICD-10-CM

## 2018-12-19 DIAGNOSIS — G47.30 SLEEP APNEA, UNSPECIFIED TYPE: ICD-10-CM

## 2018-12-19 PROCEDURE — 99244 OFF/OP CNSLTJ NEW/EST MOD 40: CPT | Performed by: INTERNAL MEDICINE

## 2018-12-19 RX ORDER — CHLORAL HYDRATE 500 MG
1000 CAPSULE ORAL DAILY
COMMUNITY

## 2018-12-19 NOTE — PROGRESS NOTES
Review of Systems      Genitourinary none   Cardiology none   Gastrointestinal none   Neurology forgetfulness and poor concentration or confusion,    Constitutional fatigue   Integumentary itching   Psychiatry anxiety and aggressiveness or irritability   Musculoskeletal none   Pulmonary snoring   ENT none   Endocrine frequent urination   Hematological none

## 2018-12-19 NOTE — PROGRESS NOTES
Consultation - [address]  44 y o  male  :1975  MHV:[id number]    Physician Requesting Consult: Arturo Spears MD     Reason for Consult : At your kind request I saw this patient for initial sleep evaluation today  The patient is here to evaluate for suspected Obstructive Sleep Apnea  PFSH, Problem List, Medications & Allergies were reviewed in EMR  He  has no past medical history on file  He has a current medication list which includes the following prescription(s): fish oil and naproxen  HPI:  He reports snoring of several years duration that is loud and disturbs his wife was also witnessed apneas  He is not aware of breathing difficulties during sleep or modifying factors  Other Complaints:  Feeling tired all the time and excessive drowsiness  Restless Leg Syndrome: reports no suggestive symptoms    Parasomnia activity: no features reported   Sleep Routine: Typical Bedtime:  11 p m  Gets OOB:  6:00 a m  TIB:7 hrs Sleep latency: upto 60 minutes and attributes to can't get comfortable and racing thoughts Sleep Interruptions: 2 x/night and is able to fall back asleep  [email or web address] hrs  Awakens: spontaneously feeling never rested  He has Excessive Daytime Sleepiness and dozes off whenever sedentary  [place] Sleepiness Scale rated at Total score: 12 /24  Habits: reports that he has been smoking Cigars  He has never used smokeless tobacco , reports that he drinks alcohol ,  reports that he does not use drugs  ,Caffeine use: limited , Exercise routine: regular    Family History: Negative for sleep disturbance  ROS: reviewed & as attached  Significant for weight has been stable  He had a workup for chest pain that was negative and attributed to anxiety  He has acid reflux mainly at night       EXAM:    Vitals /82   Pulse 78   Ht 5' 10" (1 778 m)   Wt 99 3 kg (219 lb)   BMI 31 42 kg/m²     General  Well groomed male, appears stated age, in no apparent distress  Psychiatric  Alert and cooperative  Mental state appears normal  Judgement & Insight  good   Head   Craniofacial anatomy:normal Sinuses: non- tender  TMJ: Normal     Eyes   EOM's intact, conjunctiva/corneas clear         Nasal Airway  is patent Septum:central, Mucous membranes:appear normal     Turbinates:  are normal  There is no rhinorrhea; No PND     Oral   Airway   crowded Tongue:Modified Mallampati class IV (only hard palate visible)  Palate:  redundant soft palate and Uvular HypertrophyTonsils: no hypertrophy  Teeth: normal       Neck    muscular; Neck Circumference: 41cm; Supple; no abnormal masses; Thyroid:normal  Trachea:central      Lymph    No Cervical or Submandibular Lymhadenopathy   Heart:    RRR; S1,S2 normal; no gallop; nomurmurs     Lungs   Respiratory Effort:normal  Air entry good bilaterally  No wheezes  No rales   Abdomen   Obese, Soft & non-tender     Extremities   No pedal edema  No clubbing or cyanosis  Skin   Skin is warm and dry; Color& Hydration good; no facial rashes or lesions    Neurologic  Speech is clear and coherent  CNII-XII intact  Rombergs Negative  Muscskeltl    Muscle bulk, tone and power WNL Gait:normal          IMPRESSION: Primary Sleep/Secondary(to Medical or Psych conditions) & comorbidities   1  Excessive daytime sleepiness  Ambulatory referral to Sleep Medicine   2  Snoring  Ambulatory referral to Sleep Medicine   3  Witnessed episode of apnea     4  Sleep apnea, unspecified type  Ambulatory referral to Sleep Medicine   5  Obesity (BMI 30-39  9)          PLAN:   1  Comprehensive counseling was provided on pathophysiology, diagnostic strategies & treatment options; effects on symptoms and comorbidities; risks of inadequate therapy; costs and insurance aspects  2  I advised on weight reduction, avoiding sleeping supine, using alcohol or sedating medications close to bed time and on safe driving practices     3  Cognitive behavioral therapy was initiated with advise on Sleep Hygiene and behavioral techniques to manage Insomnia  Specifically limiting time in bed to 6-1/2 hours, continuing regular exercise and on relaxation techniques  4  Nocturnal polysomnography is indicated and a diagnostic study will be scheduled  5  Patient is willing to try Positive airway pressure therapy and will be scheduled for a titration study if indicated  6  Follow-up will be scheduled after the studies to review results, further details of treatment options and to initiate/adjust therapy  Thank you for allowing me to participate in the care of this patient  I will keep you apprised of developments      Sincerely,     Authenticated electronically by Malvin Irizarry MD   on 05/31/83   Board Certified Specialist

## 2019-02-25 DIAGNOSIS — G47.33 OSA (OBSTRUCTIVE SLEEP APNEA): Primary | ICD-10-CM

## 2019-03-01 ENCOUNTER — TELEPHONE (OUTPATIENT)
Dept: SLEEP CENTER | Facility: CLINIC | Age: 44
End: 2019-03-01

## 2019-03-01 NOTE — TELEPHONE ENCOUNTER
Called patient and left a message  I offered home sleep study for tonight 3/1/2019 patient's diagnotic in lab study canceled was canceled due to insurance  CPAP study will need to be rescheduled patient's diagnostic is not done

## 2019-03-06 ENCOUNTER — TELEPHONE (OUTPATIENT)
Dept: FAMILY MEDICINE CLINIC | Facility: CLINIC | Age: 44
End: 2019-03-06

## 2019-03-06 DIAGNOSIS — Z13.220 NEED FOR LIPID SCREENING: ICD-10-CM

## 2019-03-06 DIAGNOSIS — Z13.1 SCREENING FOR DIABETES MELLITUS: Primary | ICD-10-CM

## 2019-04-04 ENCOUNTER — HOSPITAL ENCOUNTER (OUTPATIENT)
Dept: SLEEP CENTER | Facility: CLINIC | Age: 44
Discharge: HOME | End: 2019-04-04
Payer: COMMERCIAL

## 2019-04-04 DIAGNOSIS — G47.33 OSA (OBSTRUCTIVE SLEEP APNEA): ICD-10-CM

## 2019-04-04 PROCEDURE — G0399 HOME SLEEP TEST/TYPE 3 PORTA: HCPCS

## 2019-04-09 ENCOUNTER — TELEPHONE (OUTPATIENT)
Dept: SLEEP CENTER | Facility: CLINIC | Age: 44
End: 2019-04-09

## 2019-04-10 ENCOUNTER — APPOINTMENT (OUTPATIENT)
Dept: LAB | Facility: CLINIC | Age: 44
End: 2019-04-10
Payer: COMMERCIAL

## 2019-04-10 DIAGNOSIS — Z13.1 SCREENING FOR DIABETES MELLITUS: ICD-10-CM

## 2019-04-10 DIAGNOSIS — Z13.220 NEED FOR LIPID SCREENING: ICD-10-CM

## 2019-04-10 LAB
ALBUMIN SERPL BCP-MCNC: 4.4 G/DL (ref 3.5–5)
ALP SERPL-CCNC: 70 U/L (ref 46–116)
ALT SERPL W P-5'-P-CCNC: 43 U/L (ref 12–78)
ANION GAP SERPL CALCULATED.3IONS-SCNC: 5 MMOL/L (ref 4–13)
AST SERPL W P-5'-P-CCNC: 26 U/L (ref 5–45)
BILIRUB SERPL-MCNC: 0.57 MG/DL (ref 0.2–1)
BUN SERPL-MCNC: 14 MG/DL (ref 5–25)
CALCIUM SERPL-MCNC: 9.5 MG/DL (ref 8.3–10.1)
CHLORIDE SERPL-SCNC: 110 MMOL/L (ref 100–108)
CHOLEST SERPL-MCNC: 255 MG/DL (ref 50–200)
CO2 SERPL-SCNC: 27 MMOL/L (ref 21–32)
CREAT SERPL-MCNC: 1.21 MG/DL (ref 0.6–1.3)
EST. AVERAGE GLUCOSE BLD GHB EST-MCNC: 97 MG/DL
GFR SERPL CREATININE-BSD FRML MDRD: 73 ML/MIN/1.73SQ M
GLUCOSE P FAST SERPL-MCNC: 95 MG/DL (ref 65–99)
HBA1C MFR BLD: 5 % (ref 4.2–6.3)
HDLC SERPL-MCNC: 41 MG/DL (ref 40–60)
LDLC SERPL CALC-MCNC: 172 MG/DL (ref 0–100)
NONHDLC SERPL-MCNC: 214 MG/DL
POTASSIUM SERPL-SCNC: 4.3 MMOL/L (ref 3.5–5.3)
PROT SERPL-MCNC: 7.8 G/DL (ref 6.4–8.2)
SODIUM SERPL-SCNC: 142 MMOL/L (ref 136–145)
TRIGL SERPL-MCNC: 208 MG/DL

## 2019-04-10 PROCEDURE — 36415 COLL VENOUS BLD VENIPUNCTURE: CPT

## 2019-04-10 PROCEDURE — 80053 COMPREHEN METABOLIC PANEL: CPT

## 2019-04-10 PROCEDURE — 83036 HEMOGLOBIN GLYCOSYLATED A1C: CPT

## 2019-04-10 PROCEDURE — 80061 LIPID PANEL: CPT

## 2019-04-12 ENCOUNTER — OFFICE VISIT (OUTPATIENT)
Dept: FAMILY MEDICINE CLINIC | Facility: CLINIC | Age: 44
End: 2019-04-12
Payer: COMMERCIAL

## 2019-04-12 ENCOUNTER — TELEPHONE (OUTPATIENT)
Dept: FAMILY MEDICINE CLINIC | Facility: CLINIC | Age: 44
End: 2019-04-12

## 2019-04-12 VITALS
TEMPERATURE: 98.6 F | SYSTOLIC BLOOD PRESSURE: 122 MMHG | BODY MASS INDEX: 31.7 KG/M2 | OXYGEN SATURATION: 96 % | HEART RATE: 82 BPM | HEIGHT: 70 IN | DIASTOLIC BLOOD PRESSURE: 78 MMHG | WEIGHT: 221.4 LBS

## 2019-04-12 DIAGNOSIS — E78.00 PURE HYPERCHOLESTEROLEMIA: Primary | ICD-10-CM

## 2019-04-12 DIAGNOSIS — R03.0 ELEVATED BLOOD PRESSURE READING WITHOUT DIAGNOSIS OF HYPERTENSION: ICD-10-CM

## 2019-04-12 DIAGNOSIS — E78.5 HYPERLIPIDEMIA, UNSPECIFIED HYPERLIPIDEMIA TYPE: Primary | ICD-10-CM

## 2019-04-12 PROCEDURE — 3008F BODY MASS INDEX DOCD: CPT | Performed by: FAMILY MEDICINE

## 2019-04-12 PROCEDURE — 99214 OFFICE O/P EST MOD 30 MIN: CPT | Performed by: FAMILY MEDICINE

## 2019-04-12 RX ORDER — ATORVASTATIN CALCIUM 10 MG/1
10 TABLET, FILM COATED ORAL DAILY
Qty: 90 TABLET | Refills: 1 | Status: SHIPPED | OUTPATIENT
Start: 2019-04-12 | End: 2019-11-26 | Stop reason: SDUPTHER

## 2019-05-07 ENCOUNTER — HOSPITAL ENCOUNTER (OUTPATIENT)
Dept: SLEEP CENTER | Facility: CLINIC | Age: 44
Discharge: HOME | End: 2019-05-07
Payer: COMMERCIAL

## 2019-05-07 DIAGNOSIS — G47.19 EXCESSIVE DAYTIME SLEEPINESS: ICD-10-CM

## 2019-05-07 PROCEDURE — 95811 POLYSOM 6/>YRS CPAP 4/> PARM: CPT

## 2019-05-08 DIAGNOSIS — G47.33 OSA (OBSTRUCTIVE SLEEP APNEA): Primary | ICD-10-CM

## 2019-05-09 ENCOUNTER — TELEPHONE (OUTPATIENT)
Dept: SLEEP CENTER | Facility: CLINIC | Age: 44
End: 2019-05-09

## 2019-06-03 ENCOUNTER — OFFICE VISIT (OUTPATIENT)
Dept: SLEEP CENTER | Facility: CLINIC | Age: 44
End: 2019-06-03
Payer: COMMERCIAL

## 2019-06-03 VITALS
SYSTOLIC BLOOD PRESSURE: 116 MMHG | DIASTOLIC BLOOD PRESSURE: 82 MMHG | BODY MASS INDEX: 31.35 KG/M2 | HEIGHT: 70 IN | WEIGHT: 219 LBS

## 2019-06-03 DIAGNOSIS — G47.33 OSA (OBSTRUCTIVE SLEEP APNEA): Primary | ICD-10-CM

## 2019-06-03 DIAGNOSIS — E66.9 OBESITY (BMI 30-39.9): ICD-10-CM

## 2019-06-03 DIAGNOSIS — G47.09 OTHER INSOMNIA: ICD-10-CM

## 2019-06-03 DIAGNOSIS — G47.19 EXCESSIVE DAYTIME SLEEPINESS: ICD-10-CM

## 2019-06-03 PROCEDURE — 99214 OFFICE O/P EST MOD 30 MIN: CPT | Performed by: INTERNAL MEDICINE

## 2019-07-24 ENCOUNTER — APPOINTMENT (OUTPATIENT)
Dept: LAB | Facility: CLINIC | Age: 44
End: 2019-07-24
Payer: COMMERCIAL

## 2019-07-24 DIAGNOSIS — E78.5 HYPERLIPIDEMIA, UNSPECIFIED HYPERLIPIDEMIA TYPE: ICD-10-CM

## 2019-07-24 LAB
CHOLEST SERPL-MCNC: 171 MG/DL (ref 50–200)
HDLC SERPL-MCNC: 47 MG/DL (ref 40–60)
LDLC SERPL CALC-MCNC: 84 MG/DL (ref 0–100)
NONHDLC SERPL-MCNC: 124 MG/DL
TRIGL SERPL-MCNC: 198 MG/DL

## 2019-07-24 PROCEDURE — 80061 LIPID PANEL: CPT

## 2019-07-24 PROCEDURE — 36415 COLL VENOUS BLD VENIPUNCTURE: CPT

## 2019-07-26 ENCOUNTER — OFFICE VISIT (OUTPATIENT)
Dept: FAMILY MEDICINE CLINIC | Facility: CLINIC | Age: 44
End: 2019-07-26
Payer: COMMERCIAL

## 2019-07-26 VITALS
BODY MASS INDEX: 31.75 KG/M2 | SYSTOLIC BLOOD PRESSURE: 118 MMHG | DIASTOLIC BLOOD PRESSURE: 78 MMHG | HEART RATE: 71 BPM | OXYGEN SATURATION: 97 % | HEIGHT: 70 IN | WEIGHT: 221.8 LBS | TEMPERATURE: 97.9 F

## 2019-07-26 DIAGNOSIS — E78.2 MIXED HYPERLIPIDEMIA: Primary | ICD-10-CM

## 2019-07-26 PROBLEM — E11.9 TYPE 2 DIABETES MELLITUS WITHOUT COMPLICATION, WITHOUT LONG-TERM CURRENT USE OF INSULIN (HCC): Status: RESOLVED | Noted: 2019-07-26 | Resolved: 2019-07-26

## 2019-07-26 PROBLEM — Z12.5 SCREENING FOR PROSTATE CANCER: Status: ACTIVE | Noted: 2019-07-26

## 2019-07-26 PROBLEM — Z82.49 FAMILY HISTORY OF HEART DISEASE: Status: RESOLVED | Noted: 2019-07-26 | Resolved: 2019-07-26

## 2019-07-26 PROBLEM — Z12.5 SCREENING FOR PROSTATE CANCER: Status: RESOLVED | Noted: 2019-07-26 | Resolved: 2019-07-26

## 2019-07-26 PROBLEM — E11.9 TYPE 2 DIABETES MELLITUS WITHOUT COMPLICATION, WITHOUT LONG-TERM CURRENT USE OF INSULIN (HCC): Status: ACTIVE | Noted: 2019-07-26

## 2019-07-26 PROBLEM — Z11.59 NEED FOR HEPATITIS C SCREENING TEST: Status: RESOLVED | Noted: 2019-07-26 | Resolved: 2019-07-26

## 2019-07-26 PROBLEM — R53.83 FATIGUE: Status: ACTIVE | Noted: 2019-07-26

## 2019-07-26 PROBLEM — R53.83 FATIGUE: Status: RESOLVED | Noted: 2019-07-26 | Resolved: 2019-07-26

## 2019-07-26 PROBLEM — Z12.11 SCREENING FOR COLON CANCER: Status: RESOLVED | Noted: 2019-07-26 | Resolved: 2019-07-26

## 2019-07-26 PROBLEM — R09.89 POOR CIRCULATION OF EXTREMITY: Status: RESOLVED | Noted: 2019-07-26 | Resolved: 2019-07-26

## 2019-07-26 PROBLEM — Z82.49 FAMILY HISTORY OF HEART DISEASE: Status: ACTIVE | Noted: 2019-07-26

## 2019-07-26 PROBLEM — E55.9 VITAMIN D DEFICIENCY: Status: RESOLVED | Noted: 2019-07-26 | Resolved: 2019-07-26

## 2019-07-26 PROBLEM — Z11.59 NEED FOR HEPATITIS C SCREENING TEST: Status: ACTIVE | Noted: 2019-07-26

## 2019-07-26 PROBLEM — E55.9 VITAMIN D DEFICIENCY: Status: ACTIVE | Noted: 2019-07-26

## 2019-07-26 PROBLEM — H92.02: Status: RESOLVED | Noted: 2019-07-26 | Resolved: 2019-07-26

## 2019-07-26 PROBLEM — I10 ESSENTIAL HYPERTENSION: Status: ACTIVE | Noted: 2018-10-25

## 2019-07-26 PROBLEM — H92.02: Status: ACTIVE | Noted: 2019-07-26

## 2019-07-26 PROBLEM — R03.0 ELEVATED BLOOD PRESSURE READING WITHOUT DIAGNOSIS OF HYPERTENSION: Status: RESOLVED | Noted: 2018-10-25 | Resolved: 2019-07-26

## 2019-07-26 PROBLEM — R09.89 POOR CIRCULATION OF EXTREMITY: Status: ACTIVE | Noted: 2019-07-26

## 2019-07-26 PROBLEM — Z12.11 SCREENING FOR COLON CANCER: Status: ACTIVE | Noted: 2019-07-26

## 2019-07-26 PROCEDURE — 99213 OFFICE O/P EST LOW 20 MIN: CPT | Performed by: FAMILY MEDICINE

## 2019-07-26 PROCEDURE — 3008F BODY MASS INDEX DOCD: CPT | Performed by: FAMILY MEDICINE

## 2019-07-26 RX ORDER — AMOXICILLIN AND CLAVULANATE POTASSIUM 875; 125 MG/1; MG/1
1 TABLET, FILM COATED ORAL EVERY 12 HOURS SCHEDULED
Qty: 10 TABLET | Refills: 0 | Status: SHIPPED | OUTPATIENT
Start: 2019-07-26 | End: 2019-07-26 | Stop reason: CLARIF

## 2019-07-26 NOTE — PROGRESS NOTES
Assessment/Plan:    No problem-specific Assessment & Plan notes found for this encounter  Diagnoses and all orders for this visit:    Mixed hyperlipidemia  Improved advised to continue daily statin and fish oil      Follow up in 6 months        Subjective:      Patient ID: Sharda Jarrell is a 44 y o  male  He is here to follow up for hyperlipidemia his LDL and total cholesterol are much improved per recent blood work  He is taking fish oil and statin medication daily  The following portions of the patient's history were reviewed and updated as appropriate:   He  has no past medical history on file  He   Patient Active Problem List    Diagnosis Date Noted    Type 2 diabetes mellitus without complication, without long-term current use of insulin ([facility] [address] ) 07/26/2019    Screening for prostate cancer 07/26/2019    Vitamin D deficiency 07/26/2019    Poor circulation of extremity 07/26/2019    Family history of heart disease 07/26/2019    Fatigue 07/26/2019    Screening for colon cancer 07/26/2019    Need for hepatitis C screening test 07/26/2019    Discomfort of ear, left 07/26/2019    DRISS (obstructive sleep apnea)     Sleep apnea 12/19/2018    Obesity (BMI 30-39 9) 12/19/2018    Other insomnia 12/19/2018    Dyslipidemia 11/08/2018    Excessive daytime sleepiness 11/08/2018    Snoring 11/08/2018    Essential hypertension 10/25/2018    Chest pain 10/25/2018    Diabetes mellitus screening 10/25/2018    Need for lipid screening 10/25/2018    Irritant contact dermatitis due to other agents 02/15/2018    Acute gouty arthropathy 10/13/2017    Hyperlipidemia 03/31/2016     He  has no past surgical history on file  His family history includes Ovarian cancer in his mother  He  reports that he has been smoking cigars  He has never used smokeless tobacco  He reports that he drinks alcohol  He reports that he does not use drugs    Current Outpatient Medications   Medication Sig Dispense Refill    atorvastatin (LIPITOR) 10 mg tablet Take 1 tablet (10 mg total) by mouth daily for 90 days 90 tablet 1    Omega-3 Fatty Acids (FISH OIL) 1,000 mg Take 1,000 mg by mouth daily      amoxicillin-clavulanate (AUGMENTIN) 875-125 mg per tablet Take 1 tablet by mouth every 12 (twelve) hours for 5 days 10 tablet 0    loratadine-pseudoephedrine (CLARITIN-D 12-HOUR) 5-120 mg per tablet Take 1 tablet by mouth 2 (two) times a day for 5 days 10 tablet 0     No current facility-administered medications for this visit  Current Outpatient Medications on File Prior to Visit   Medication Sig    atorvastatin (LIPITOR) 10 mg tablet Take 1 tablet (10 mg total) by mouth daily for 90 days    Omega-3 Fatty Acids (FISH OIL) 1,000 mg Take 1,000 mg by mouth daily     No current facility-administered medications on file prior to visit  He is allergic to gluten meal     Review of Systems   Constitutional: Negative for activity change, appetite change, fatigue and fever  HENT: Negative for congestion, dental problem, drooling, ear discharge, ear pain, facial swelling, hearing loss, mouth sores, nosebleeds, postnasal drip, rhinorrhea, sinus pressure and sinus pain  Respiratory: Negative for cough and shortness of breath  Cardiovascular: Negative for chest pain and palpitations  Gastrointestinal: Negative for diarrhea and nausea  Musculoskeletal: Negative for arthralgias and back pain  Skin: Negative for color change and rash  Neurological: Negative for dizziness and headaches  Psychiatric/Behavioral: Negative for agitation and behavioral problems  Objective:      /78   Pulse 71   Temp 97 9 °F (36 6 °C) (Tympanic)   Ht 5' 10" (1 778 m)   Wt 101 kg (221 lb 12 8 oz)   SpO2 97%   BMI 31 82 kg/m²          Physical Exam   Constitutional: He is oriented to person, place, and time  He appears well-developed and well-nourished  No distress     Eyes: Pupils are equal, round, and reactive to light  No scleral icterus  Cardiovascular: Normal rate, regular rhythm and normal heart sounds  No murmur heard  Pulmonary/Chest: Effort normal and breath sounds normal  No respiratory distress  He has no wheezes  Abdominal: Soft  Bowel sounds are normal  He exhibits no distension  There is no tenderness  Neurological: He is alert and oriented to person, place, and time  Skin: Skin is warm and dry  No rash noted  He is not diaphoretic  Psychiatric: He has a normal mood and affect

## 2019-08-19 ENCOUNTER — OFFICE VISIT (OUTPATIENT)
Dept: SLEEP CENTER | Facility: CLINIC | Age: 44
End: 2019-08-19
Payer: COMMERCIAL

## 2019-08-19 ENCOUNTER — TELEPHONE (OUTPATIENT)
Dept: SLEEP CENTER | Facility: CLINIC | Age: 44
End: 2019-08-19

## 2019-08-19 VITALS
SYSTOLIC BLOOD PRESSURE: 108 MMHG | HEIGHT: 70 IN | BODY MASS INDEX: 32.41 KG/M2 | WEIGHT: 226.38 LBS | DIASTOLIC BLOOD PRESSURE: 82 MMHG

## 2019-08-19 DIAGNOSIS — G47.33 OSA (OBSTRUCTIVE SLEEP APNEA): Primary | ICD-10-CM

## 2019-08-19 DIAGNOSIS — E66.9 OBESITY (BMI 30-39.9): ICD-10-CM

## 2019-08-19 DIAGNOSIS — G47.19 EXCESSIVE DAYTIME SLEEPINESS: ICD-10-CM

## 2019-08-19 DIAGNOSIS — G47.09 OTHER INSOMNIA: ICD-10-CM

## 2019-08-19 PROCEDURE — 99214 OFFICE O/P EST MOD 30 MIN: CPT | Performed by: INTERNAL MEDICINE

## 2019-08-19 NOTE — PATIENT INSTRUCTIONS

## 2019-08-19 NOTE — PROGRESS NOTES
Review of Systems      Genitourinary none   Cardiology none   Gastrointestinal none   Neurology awaken with headache   Constitutional none   Integumentary none   Psychiatry none   Musculoskeletal none   Pulmonary none   ENT none   Endocrine none   Hematological none

## 2019-08-19 NOTE — TELEPHONE ENCOUNTER
Received an RX for a pressure change  Changed accordingly  PT's now set to  an APAP of 8-11cm  Told PT while he was in the office

## 2019-08-19 NOTE — PROGRESS NOTES
Follow-Up Note - [facility]   Janey Lawton  38 y o  male  :1975  [facility]:[id number]    CC: I saw this patient for follow-up in clinic today for his Sleep Disordered Breathing, Coexisting Sleep and Medical Problems  He was set up with CPAP around 2 months ago  Home sleep testing demonstrated moderate to severe Obstructive sleep apnea: JONNA (respiratory event index of) 26 6 /hour  Minimum oxygen saturation was 79% and 8 1% of the study was spent with saturations less than 90%  The snore index was 36 5%  During the subsequent therapeutic study, sleep disordered breathing was successfully remediated with PAP at 8cm  H2O    PFSH, Problem List, Medications & Allergies were reviewed in EMR  Interval changes: none reported  He has a current medication list which includes the following prescription(s): atorvastatin and fish oil  ROS: Reviewed (see attached)  DATA REVIEWED:  using PAP > 4 hours/night 90% of the time  Estimated JONNA 5/hour at pressure of 8 cm H2O    SUBJECTIVE: Regarding use of PAP, Heron Nixons reports:   · He is experiencing some adverse effects: dry mouth  · He is   benefiting from use: sleeping better, improved headaches  and no longer snoring / having breathing difficulties   Sleep Routine: He reports getting 7 hrs sleep  ; he has no difficulty initiating or maintaining sleep   He awakens spontaneously and feels refreshed  He denied excessive drowsiness   He rated himself at Total score: 9 /24 on the [place] sleepiness scale  Habits: reports that he has been smoking cigars  He uses smokeless tobacco ,  reports that he drinks alcohol ,  reports that he does not use drugs  , Caffeine use: limited , Exercise routine: regular    OBJECTIVE: /82   Ht 5' 10" (1 778 m)   Wt 103 kg (226 lb 6 oz)   BMI 32 48 kg/m²    Constitutional: Patient is well groomed; well appearing  Skin/Extrem: warm & dry; col & hydration normal; no edema  Psych: cooperativeand in no distress  Mental State appears normal   CNS: Alert, orientated, clear & coherent speech  H&N: EOMI; NC/AT:no facial pressure marks, no rashes  ENMT Mucus membranes normal Nasal airway:patent  Oral airway: crowded  Resp:effort is normal CVS: RRR ABD:truncal obesity MSK:Gait normal     ASSESSMENT: Primary Sleep/Secondary(to Medical or Psych conditions) & comorbidities   1  DRISS (obstructive sleep apnea)  Cpap DME   2  Other insomnia      Improved   3  Excessive daytime sleepiness      Improved   4  Obesity (BMI 30-39  9)         PLAN:  1  Treatment with  PAP is medically necessary and Reidfionaroro Wylie is agreable to continue use  2  Care of equipment, methods to improve comfort using PAP and importance of compliance with therapy were discussed  3  Pressure setting: change 8-11 cmH2O     4  Rx provided to replace supplies and Care coordinated with DME provider  5  Strategies for weight reduction were discussed  6  Follow-up is advised in 1 year or sooner if needed to monitor progress, compliance and to adjust therapy  Thank you for allowing me to participate in the care of this patient      Sincerely,    Authenticated electronically by Eriberto Tavares MD on 19/01/36   Board Certified Specialist

## 2019-11-26 DIAGNOSIS — E78.00 PURE HYPERCHOLESTEROLEMIA: ICD-10-CM

## 2019-11-26 RX ORDER — ATORVASTATIN CALCIUM 10 MG/1
10 TABLET, FILM COATED ORAL DAILY
Qty: 90 TABLET | Refills: 1 | Status: SHIPPED | OUTPATIENT
Start: 2019-11-26 | End: 2020-09-28 | Stop reason: SDUPTHER

## 2020-09-28 ENCOUNTER — OFFICE VISIT (OUTPATIENT)
Dept: FAMILY MEDICINE CLINIC | Facility: CLINIC | Age: 45
End: 2020-09-28
Payer: COMMERCIAL

## 2020-09-28 VITALS
DIASTOLIC BLOOD PRESSURE: 88 MMHG | HEART RATE: 84 BPM | WEIGHT: 223 LBS | RESPIRATION RATE: 18 BRPM | SYSTOLIC BLOOD PRESSURE: 140 MMHG | OXYGEN SATURATION: 97 % | TEMPERATURE: 97.2 F | HEIGHT: 70 IN | BODY MASS INDEX: 31.92 KG/M2

## 2020-09-28 DIAGNOSIS — Z00.00 ANNUAL PHYSICAL EXAM: Primary | ICD-10-CM

## 2020-09-28 DIAGNOSIS — E78.00 PURE HYPERCHOLESTEROLEMIA: ICD-10-CM

## 2020-09-28 PROCEDURE — 99396 PREV VISIT EST AGE 40-64: CPT | Performed by: NURSE PRACTITIONER

## 2020-09-28 PROCEDURE — 3725F SCREEN DEPRESSION PERFORMED: CPT | Performed by: NURSE PRACTITIONER

## 2020-09-28 RX ORDER — ATORVASTATIN CALCIUM 10 MG/1
10 TABLET, FILM COATED ORAL DAILY
Qty: 90 TABLET | Refills: 1 | Status: SHIPPED | OUTPATIENT
Start: 2020-09-28 | End: 2021-06-28 | Stop reason: SDUPTHER

## 2020-09-28 NOTE — PATIENT INSTRUCTIONS
Annual PE for Employer- PE wnl  HLD- refilled statin medication  Encouraged daily exercise, whole grains green veggies, and only lean meats    Wellness Visit for Adults   AMBULATORY CARE:   A wellness visit  is when you see your healthcare provider to get screened for health problems  You can also get advice on how to stay healthy  Write down your questions so you remember to ask them  Ask your healthcare provider how often you should have a wellness visit  What happens at a wellness visit:  Your healthcare provider will ask about your health, and your family history of health problems  This includes high blood pressure, heart disease, and cancer  He or she will ask if you have symptoms that concern you, if you smoke, and about your mood  You may also be asked about your intake of medicines, supplements, food, and alcohol  Any of the following may be done:  · Your weight  will be checked  Your height may also be checked so your body mass index (BMI) can be calculated  Your BMI shows if you are at a healthy weight  · Your blood pressure  and heart rate will be checked  Your temperature may also be checked  · Blood and urine tests  may be done  Blood tests may be done to check your cholesterol levels  Abnormal cholesterol levels increase your risk for heart disease and stroke  You may also need a blood or urine test to check for diabetes if you are at increased risk  Urine tests may be done to look for signs of an infection or kidney disease  · A physical exam  includes checking your heartbeat and lungs with a stethoscope  Your healthcare provider may also check your skin to look for sun damage  · Screening tests  may be recommended  A screening test is done to check for diseases that may not cause symptoms  The screening tests you may need depend on your age, gender, family history, and lifestyle habits   For example, colorectal screening may be recommended if you are 48years old or older   Screening tests you need if you are a woman:   · A Pap smear  is used to screen for cervical cancer  Pap smears are usually done every 3 to 5 years depending on your age  You may need them more often if you have had abnormal Pap smear test results in the past  Ask your healthcare provider how often you should have a Pap smear  · A mammogram  is an x-ray of your breasts to screen for breast cancer  Experts recommend mammograms every 2 years starting at age 48 years  You may need a mammogram at age 52 years or younger if you have an increased risk for breast cancer  Talk to your healthcare provider about when you should start having mammograms and how often you need them  Vaccines you may need:   · Get an influenza vaccine  every year  The influenza vaccine protects you from the flu  Several types of viruses cause the flu  The viruses change over time, so new vaccines are made each year  · Get a tetanus-diphtheria (Td) booster vaccine  every 10 years  This vaccine protects you against tetanus and diphtheria  Tetanus is a severe infection that may cause painful muscle spasms and lockjaw  Diphtheria is a severe bacterial infection that causes a thick covering in the back of your mouth and throat  · Get a human papillomavirus (HPV) vaccine  if you are female and aged 23 to 32 or male 23 to 24 and never received it  This vaccine protects you from HPV infection  HPV is the most common infection spread by sexual contact  HPV may also cause vaginal, penile, and anal cancers  · Get a pneumococcal vaccine  if you are aged 72 years or older  The pneumococcal vaccine is an injection given to protect you from pneumococcal disease  Pneumococcal disease is an infection caused by pneumococcal bacteria  The infection may cause pneumonia, meningitis, or an ear infection  · Get a shingles vaccine  if you are aged 61 or older, even if you have had shingles before   The shingles vaccine is an injection to protect you from the varicella-zoster virus  This is the same virus that causes chickenpox  Shingles is a painful rash that develops in people who had chickenpox or have been exposed to the virus  How to eat healthy:  My Plate is a model for planning healthy meals  It shows the types and amounts of foods that should go on your plate  Fruits and vegetables make up about half of your plate, and grains and protein make up the other half  A serving of dairy is included on the side of your plate  The amount of calories and serving sizes you need depends on your age, gender, weight, and height  Examples of healthy foods are listed below:  · Eat a variety of vegetables  such as dark green, red, and orange vegetables  You can also include canned vegetables low in sodium (salt) and frozen vegetables without added butter or sauces  · Eat a variety of fresh fruits , canned fruit in 100% juice, frozen fruit, and dried fruit  · Include whole grains  At least half of the grains you eat should be whole grains  Examples include whole-wheat bread, wheat pasta, brown rice, and whole-grain cereals such as oatmeal     · Eat a variety of protein foods such as seafood (fish and shellfish), lean meat, and poultry without skin (turkey and chicken)  Examples of lean meats include pork leg, shoulder, or tenderloin, and beef round, sirloin, tenderloin, and extra lean ground beef  Other protein foods include eggs and egg substitutes, beans, peas, soy products, nuts, and seeds  · Choose low-fat dairy products such as skim or 1% milk or low-fat yogurt, cheese, and cottage cheese  · Limit unhealthy fats  such as butter, hard margarine, and shortening  Exercise:  Exercise at least 30 minutes per day on most days of the week  Some examples of exercise include walking, biking, dancing, and swimming  You can also fit in more physical activity by taking the stairs instead of the elevator or parking farther away from stores  Include muscle strengthening activities 2 days each week  Regular exercise provides many health benefits  It helps you manage your weight, and decreases your risk for type 2 diabetes, heart disease, stroke, and high blood pressure  Exercise can also help improve your mood  Ask your healthcare provider about the best exercise plan for you  General health and safety guidelines:   · Do not smoke  Nicotine and other chemicals in cigarettes and cigars can cause lung damage  Ask your healthcare provider for information if you currently smoke and need help to quit  E-cigarettes or smokeless tobacco still contain nicotine  Talk to your healthcare provider before you use these products  · Limit alcohol  A drink of alcohol is 12 ounces of beer, 5 ounces of wine, or 1½ ounces of liquor  · Lose weight, if needed  Being overweight increases your risk of certain health conditions  These include heart disease, high blood pressure, type 2 diabetes, and certain types of cancer  · Protect your skin  Do not sunbathe or use tanning beds  Use sunscreen with a SPF 15 or higher  Apply sunscreen at least 15 minutes before you go outside  Reapply sunscreen every 2 hours  Wear protective clothing, hats, and sunglasses when you are outside  · Drive safely  Always wear your seatbelt  Make sure everyone in your car wears a seatbelt  A seatbelt can save your life if you are in an accident  Do not use your cell phone when you are driving  This could distract you and cause an accident  Pull over if you need to make a call or send a text message  · Practice safe sex  Use latex condoms if are sexually active and have more than one partner  Your healthcare provider may recommend screening tests for sexually transmitted infections (STIs)  · Wear helmets, lifejackets, and protective gear  Always wear a helmet when you ride a bike or motorcycle, go skiing, or play sports that could cause a head injury   Wear protective equipment when you play sports  Wear a lifejacket when you are on a boat or doing water sports  © 2017 [employer] Information is for End User's use only and may not be sold, redistributed or otherwise used for commercial purposes  All illustrations and images included in CareNotes® are the copyrighted property of [employer]  or Ervin Gar[employer]  The above information is an  only  It is not intended as medical advice for individual conditions or treatments  Talk to your doctor, nurse or pharmacist before following any medical regimen to see if it is safe and effective for you

## 2020-09-28 NOTE — PROGRESS NOTES
ADULT ANNUAL [facility]    NAME: Adonis Lan  AGE: 41 y o  SEX: male  : 1975     DATE: 2020     Assessment and Plan:     Problem List Items Addressed This Visit        Other    Hyperlipidemia    Relevant Medications    atorvastatin (LIPITOR) 10 mg tablet    Annual physical exam - Primary    Relevant Orders    Comprehensive metabolic panel    Lipid panel    BMI 32 0-32 9,adult    Relevant Orders    Comprehensive metabolic panel    Lipid panel          Immunizations and preventive care screenings were discussed with patient today  Appropriate education was printed on patient's after visit summary  Counseling:  Alcohol/drug use: discussed moderation in alcohol intake, the recommendations for healthy alcohol use, and avoidance of illicit drug use  Dental Health: discussed importance of regular tooth brushing, flossing, and dental visits  Injury prevention: discussed safety/seat belts, safety helmets, smoke detectors, carbon dioxide detectors, and smoking near bedding or upholstery  Sexual health: discussed sexually transmitted diseases, partner selection, use of condoms, avoidance of unintended pregnancy, and contraceptive alternatives  · Exercise: the importance of regular exercise/physical activity was discussed  Recommend exercise 3-5 times per week for at least 30 minutes  BMI Counseling: Body mass index is 32 kg/m²  The BMI is above normal  Nutrition recommendations include decreasing portion sizes, encouraging healthy choices of fruits and vegetables, decreasing fast food intake, consuming healthier snacks, limiting drinks that contain sugar, moderation in carbohydrate intake, increasing intake of lean protein, reducing intake of saturated and trans fat and reducing intake of cholesterol  Exercise recommendations include exercising 3-5 times per week and strength training exercises   No pharmacotherapy was ordered  Return in about 1 year (around 9/28/2021) for Annual physical      Chief Complaint:     Chief Complaint   Patient presents with    Physical Exam      History of Present Illness:     Adult Annual Physical   Patient here for a comprehensive physical exam  The patient reports no problems  Diet and Physical Activity  · Diet/Nutrition: frequent junk food, high fat diet and limited fruits/vegetables  · Exercise: no formal exercise  Depression Screening  PHQ-9 Depression Screening    PHQ-9:    Frequency of the following problems over the past two weeks:       Little interest or pleasure in doing things:  0 - not at all  Feeling down, depressed, or hopeless:  0 - not at all  PHQ-2 Score:  0       General Health  · Sleep: sleeps well, gets 4-6 hours of sleep on average and wears cpap  · Hearing: normal - bilateral   · Vision: no vision problems, most recent eye exam >1 year ago and wears glasses  · Dental: regular dental visits, brushes teeth twice daily and flosses teeth occasionally   Health  · Symptoms include: none     Review of Systems:     Review of Systems   Constitutional: Negative for fatigue and fever  HENT: Negative for congestion  Eyes: Negative for visual disturbance  Respiratory: Negative for cough and shortness of breath  Cardiovascular: Negative for chest pain, palpitations and leg swelling  Gastrointestinal: Negative for abdominal distention and abdominal pain  Endocrine: Negative for cold intolerance, polydipsia and polyuria  Genitourinary: Negative for difficulty urinating  Musculoskeletal: Negative for back pain and joint swelling  Skin: Negative for color change and rash  Allergic/Immunologic: Negative for immunocompromised state  Neurological: Negative for dizziness and headaches  Hematological: Negative for adenopathy  Psychiatric/Behavioral: Negative for behavioral problems and sleep disturbance     All other systems reviewed and are negative  Past Medical History:     History reviewed  No pertinent past medical history  Past Surgical History:     History reviewed  No pertinent surgical history  Family History:     Family History   Problem Relation Age of Onset    Ovarian cancer Mother       Social History:        Social History     Socioeconomic History    Marital status: /Civil Union     Spouse name: None    Number of children: None    Years of education: None    Highest education level: None   Occupational History    None   Social Needs    Financial resource strain: None    Food insecurity     Worry: None     Inability: None    Transportation needs     Medical: None     Non-medical: None   Tobacco Use    Smoking status: Light Tobacco Smoker     Types: Cigars    Smokeless tobacco: Current User    Tobacco comment: occasional cigar   Substance and Sexual Activity    Alcohol use: Yes     Comment: social    Drug use: No    Sexual activity: None   Lifestyle    Physical activity     Days per week: None     Minutes per session: None    Stress: None   Relationships    Social connections     Talks on phone: None     Gets together: None     Attends Judaism service: None     Active member of club or organization: None     Attends meetings of clubs or organizations: None     Relationship status: None    Intimate partner violence     Fear of current or ex partner: None     Emotionally abused: None     Physically abused: None     Forced sexual activity: None   Other Topics Concern    None   Social History Narrative    None      Current Medications:     Current Outpatient Medications   Medication Sig Dispense Refill    atorvastatin (LIPITOR) 10 mg tablet Take 1 tablet (10 mg total) by mouth daily 90 tablet 1    Omega-3 Fatty Acids (FISH OIL) 1,000 mg Take 1,000 mg by mouth daily       No current facility-administered medications for this visit  Allergies:      Allergies   Allergen Reactions    Gluten Meal Physical Exam:     /88 (BP Location: Left arm, Patient Position: Sitting)   Pulse 84   Temp (!) 97 2 °F (36 2 °C)   Resp 18   Ht 5' 10" (1 778 m)   Wt 101 kg (223 lb)   SpO2 97%   BMI 32 00 kg/m²     Physical Exam  Vitals signs and nursing note reviewed  Constitutional:       General: He is not in acute distress  Appearance: Normal appearance  He is obese  He is not ill-appearing, toxic-appearing or diaphoretic  HENT:      Head: Normocephalic and atraumatic  Right Ear: Tympanic membrane, ear canal and external ear normal       Left Ear: Tympanic membrane, ear canal and external ear normal       Nose: Nose normal       Mouth/Throat:      Mouth: Mucous membranes are moist    Eyes:      Extraocular Movements: Extraocular movements intact  Conjunctiva/sclera: Conjunctivae normal       Pupils: Pupils are equal, round, and reactive to light  Neck:      Musculoskeletal: Normal range of motion  Vascular: No carotid bruit  Cardiovascular:      Rate and Rhythm: Normal rate and regular rhythm  Pulses: Normal pulses  Heart sounds: Normal heart sounds  No murmur  No friction rub  No gallop  Pulmonary:      Effort: Pulmonary effort is normal  No respiratory distress  Breath sounds: Normal breath sounds  No stridor  No wheezing, rhonchi or rales  Chest:      Chest wall: No tenderness  Abdominal:      General: Bowel sounds are normal       Tenderness: There is no right CVA tenderness or left CVA tenderness  Musculoskeletal: Normal range of motion  General: No swelling, tenderness, deformity or signs of injury  Right lower leg: No edema  Left lower leg: No edema  Lymphadenopathy:      Cervical: No cervical adenopathy  Skin:     General: Skin is warm and dry  Capillary Refill: Capillary refill takes less than 2 seconds  Findings: No erythema or rash  Neurological:      General: No focal deficit present  Mental Status: He is alert  Cranial Nerves: No cranial nerve deficit  Sensory: No sensory deficit  Motor: No weakness  Coordination: Coordination normal       Gait: Gait normal       Deep Tendon Reflexes: Reflexes normal    Psychiatric:         Mood and Affect: Mood normal          Behavior: Behavior normal          Thought Content:  Thought content normal          Judgment: Judgment normal           Zen Armstrong, [address]

## 2021-04-02 ENCOUNTER — TELEPHONE (OUTPATIENT)
Dept: OTHER | Facility: OTHER | Age: 46
End: 2021-04-02

## 2021-04-02 ENCOUNTER — TELEMEDICINE (OUTPATIENT)
Dept: FAMILY MEDICINE CLINIC | Facility: CLINIC | Age: 46
End: 2021-04-02
Payer: COMMERCIAL

## 2021-04-02 DIAGNOSIS — Z20.822 EXPOSURE TO COVID-19 VIRUS: ICD-10-CM

## 2021-04-02 DIAGNOSIS — B34.9 VIRAL INFECTION, UNSPECIFIED: ICD-10-CM

## 2021-04-02 PROCEDURE — U0003 INFECTIOUS AGENT DETECTION BY NUCLEIC ACID (DNA OR RNA); SEVERE ACUTE RESPIRATORY SYNDROME CORONAVIRUS 2 (SARS-COV-2) (CORONAVIRUS DISEASE [COVID-19]), AMPLIFIED PROBE TECHNIQUE, MAKING USE OF HIGH THROUGHPUT TECHNOLOGIES AS DESCRIBED BY CMS-2020-01-R: HCPCS | Performed by: NURSE PRACTITIONER

## 2021-04-02 PROCEDURE — 99213 OFFICE O/P EST LOW 20 MIN: CPT | Performed by: NURSE PRACTITIONER

## 2021-04-02 PROCEDURE — U0005 INFEC AGEN DETEC AMPLI PROBE: HCPCS | Performed by: NURSE PRACTITIONER

## 2021-04-02 NOTE — PROGRESS NOTES
COVID-19 Outpatient Progress Note    Assessment/Plan:    Problem List Items Addressed This Visit        Other    Exposure to COVID-19 virus    Relevant Orders    Novel Coronavirus (Covid-19),PCR [facility] - Collected at [facility] or [facility]    Viral infection, unspecified    Relevant Orders    Novel Coronavirus (Covid-19),PCR [facility] - [facility] or [facility]         Disposition:     I recommended COVID-19 PCR testing on or after day 5 since last exposure and if negative can end quarantine after 7 days  Patient was instructed to watch for symptoms until 14 days after exposure  If patient were to develop symptoms, they should immediately self isolate and call our office for further guidance  I have spent 5 minutes directly with the patient  Greater than 50% of this time was spent in counseling/coordination of care regarding: patient and family education, importance of treatment compliance, risk factor reductions and impressions  Encounter provider BRITTNI Amaya    Provider located at [address]  [address] [place] [id number]    Recent Visits  No visits were found meeting these conditions  Showing recent visits within past 7 days and meeting all other requirements     Today's Visits  Date Type Provider Dept   04/02/21 Telemedicine BRITTNI Amaya [facility]   Showing today's visits and meeting all other requirements     Future Appointments  No visits were found meeting these conditions  Showing future appointments within next 150 days and meeting all other requirements        Patient agrees to participate in a virtual check in via telephone or video visit instead of presenting to the office to address urgent/immediate medical needs  Patient is aware this is a billable service  After connecting through Telephone, the patient was identified by name and date of birth   Franco Alexis was informed that this was a telemedicine visit and that the exam was being conducted confidentially over secure lines  My office door was closed  No one else was in the room  Margaret Kennedy acknowledged consent and understanding of privacy and security of the telemedicine visit  I informed the patient that I have reviewed his record in Epic and presented the opportunity for him to ask any questions regarding the visit today  The patient agreed to participate  Subjective:   Margaret Kennedy is a 41 y o  male who is concerned about COVID-19  Patient is currently asymptomatic  Patient denies fever, chills, fatigue, malaise, congestion, rhinorrhea, sore throat, anosmia, loss of taste, cough, shortness of breath, chest tightness, abdominal pain, nausea, vomiting, diarrhea, myalgias and headaches  Date of exposure: 3/23/2021    Exposure:   Contact with a person who is under investigation (PUI) for or who is positive for COVID-19 within the last 14 days?: Yes    Hospitalized recently for fever and/or lower respiratory symptoms?: No      Currently a healthcare worker that is involved in direct patient care?: No      Works in a special setting where the risk of COVID-19 transmission may be high? (this may include long-term care, correctional and care home facilities; homeless shelters; assisted-living facilities and group homes ): No      Resident in a special setting where the risk of COVID-19 transmission may be high? (this may include long-term care, correctional and care home facilities; homeless shelters; assisted-living facilities and group homes ): No      Wife tested positive on 3/23/2021    No results found for: [facility]  No past medical history on file  No past surgical history on file    Current Outpatient Medications   Medication Sig Dispense Refill    atorvastatin (LIPITOR) 10 mg tablet Take 1 tablet (10 mg total) by mouth daily 90 tablet 1    Omega-3 Fatty Acids (FISH OIL) 1,000 mg Take 1,000 mg by mouth daily       No current facility-administered medications for this visit  Allergies   Allergen Reactions    Gluten Meal - Food Allergy        Review of Systems   Constitutional: Negative for chills, fatigue and fever  HENT: Negative for congestion, rhinorrhea and sore throat  Respiratory: Negative for cough, chest tightness and shortness of breath  Gastrointestinal: Negative for abdominal pain, diarrhea, nausea and vomiting  Musculoskeletal: Negative for myalgias  Neurological: Negative for headaches  Objective: There were no vitals filed for this visit  Physical Exam  Pulmonary:      Effort: Pulmonary effort is normal    Neurological:      Mental Status: He is alert and oriented to person, place, and time  Psychiatric:         Mood and Affect: Mood normal        VIRTUAL VISIT DISCLAIMER    Lauri Hector acknowledges that he has consented to an online visit or consultation  He understands that the online visit is based solely on information provided by him, and that, in the absence of a face-to-face physical evaluation by the physician, the diagnosis he receives is both limited and provisional in terms of accuracy and completeness  This is not intended to replace a full medical face-to-face evaluation by the physician  Geno Churchill understands and accepts these terms

## 2021-04-03 LAB — SARS-COV-2 RNA RESP QL NAA+PROBE: NEGATIVE

## 2021-06-28 DIAGNOSIS — E78.00 PURE HYPERCHOLESTEROLEMIA: ICD-10-CM

## 2021-06-28 RX ORDER — ATORVASTATIN CALCIUM 10 MG/1
10 TABLET, FILM COATED ORAL DAILY
Qty: 90 TABLET | Refills: 1 | Status: SHIPPED | OUTPATIENT
Start: 2021-06-28 | End: 2021-12-07

## 2021-12-07 ENCOUNTER — OFFICE VISIT (OUTPATIENT)
Dept: URGENT CARE | Facility: CLINIC | Age: 46
End: 2021-12-07
Payer: COMMERCIAL

## 2021-12-07 VITALS
HEART RATE: 88 BPM | BODY MASS INDEX: 32.21 KG/M2 | RESPIRATION RATE: 18 BRPM | WEIGHT: 225 LBS | OXYGEN SATURATION: 98 % | TEMPERATURE: 98 F | HEIGHT: 70 IN

## 2021-12-07 DIAGNOSIS — M10.9 ACUTE GOUT OF RIGHT FOOT, UNSPECIFIED CAUSE: Primary | ICD-10-CM

## 2021-12-07 PROCEDURE — 99213 OFFICE O/P EST LOW 20 MIN: CPT | Performed by: NURSE PRACTITIONER

## 2021-12-07 RX ORDER — PREDNISONE 10 MG/1
TABLET ORAL
Qty: 20 TABLET | Refills: 0 | Status: SHIPPED | OUTPATIENT
Start: 2021-12-07 | End: 2021-12-15

## 2022-03-24 ENCOUNTER — OFFICE VISIT (OUTPATIENT)
Dept: URGENT CARE | Facility: CLINIC | Age: 47
End: 2022-03-24
Payer: COMMERCIAL

## 2022-03-24 VITALS
TEMPERATURE: 97.1 F | HEIGHT: 70 IN | RESPIRATION RATE: 18 BRPM | OXYGEN SATURATION: 96 % | WEIGHT: 228 LBS | HEART RATE: 84 BPM | BODY MASS INDEX: 32.64 KG/M2

## 2022-03-24 DIAGNOSIS — B34.9 VIRAL ILLNESS: Primary | ICD-10-CM

## 2022-03-24 PROCEDURE — 99213 OFFICE O/P EST LOW 20 MIN: CPT | Performed by: PHYSICIAN ASSISTANT

## 2022-03-24 PROCEDURE — 87636 SARSCOV2 & INF A&B AMP PRB: CPT | Performed by: PHYSICIAN ASSISTANT

## 2022-03-24 NOTE — LETTER
[facility] NOW [address]  [place] [address]  Dept: [phone]    March 24, 2022    Patient: Saray Vergara  YOB: 1975    Saray Vergara was seen and evaluated at our [facility]  Please note if Covid and Flu tests are negative, they may return to work when fever free for 24 hours without the use of a fever reducing agent  If Covid or Flu test is positive, they may return to work on 3/28/2022, as this is 5 days from the onset of symptoms  Upon return, they must then adhere to strict masking for an additional 5 days      Sincerely,    Johanna Ravi PA-C

## 2022-03-24 NOTE — PROGRESS NOTES
[facility]      NAME: Kemi Bojorquez is a 40 y o  male  : 1975    MRN: [id number]  DATE: 2022  TIME: 5:24 PM    Assessment and Plan   Viral illness [B34 9]  1  Viral illness  Covid/Flu-Office Collect       Patient Instructions   COVID-19 (Coronavirus Disease 2019)   WHAT YOU NEED TO KNOW:   COVID-19 is the disease caused by a coronavirus first discovered in 2019  Coronaviruses generally cause upper respiratory (nose, throat, and lung) infections, such as a cold  The 2019 virus spreads quickly and easily  It can be spread starting 2 to 3 days before symptoms even begin  DISCHARGE INSTRUCTIONS:   Call your local emergency number (911 in the [address] [place] [address],[address]) if:   · You have trouble breathing or shortness of breath at rest     · You have chest pain or pressure that lasts longer than 5 minutes  · You become confused or hard to wake  · Your lips or face are blue  Seek care immediately if:   · You have a fever of 104°F (40°C) or higher  Call your doctor if:   · You have symptoms of COVID-19  · You have questions or concerns about your condition or care  Medicines: You may need any of the following:  · Decongestants  help reduce nasal congestion and help you breathe more easily  If you take decongestant pills, they may make you feel restless or cause problems with your sleep  Do not use decongestant sprays for more than a few days  · Cough suppressants  help reduce coughing  Ask your healthcare provider which type of cough medicine is best for you  · Acetaminophen  decreases pain and fever  It is available without a doctor's order  Ask how much to take and how often to take it  Follow directions  Read the labels of all other medicines you are using to see if they also contain acetaminophen, or ask your doctor or pharmacist  Acetaminophen can cause liver damage if not taken correctly  Do not use more than 4 grams (4,000 milligrams) total of acetaminophen in one day  · NSAIDs , such as ibuprofen, help decrease swelling, pain, and fever  This medicine is available with or without a doctor's order  NSAIDs can cause stomach bleeding or kidney problems in certain people  If you take blood thinner medicine, always ask your healthcare provider if NSAIDs are safe for you  Always read the medicine label and follow directions  · Blood thinners  help prevent blood clots  Clots can cause strokes, heart attacks, and death  The following are general safety guidelines to follow while you are taking a blood thinner:    ? Watch for bleeding and bruising while you take blood thinners  Watch for bleeding from your gums or nose  Watch for blood in your urine and bowel movements  Use a soft washcloth on your skin, and a soft toothbrush to brush your teeth  This can keep your skin and gums from bleeding  If you shave, use an electric shaver  Do not play contact sports  ? Tell your dentist and other healthcare providers that you take a blood thinner  Wear a bracelet or necklace that says you take this medicine  ? Do not start or stop any other medicines unless your healthcare provider tells you to  Many medicines cannot be used with blood thinners  ? Take your blood thinner exactly as prescribed by your healthcare provider  Do not skip does or take less than prescribed  Tell your provider right away if you forget to take your blood thinner, or if you take too much  ? Warfarin  is a blood thinner that you may need to take  The following are things you should be aware of if you take warfarin:     § Foods and medicines can affect the amount of warfarin in your blood  Do not make major changes to your diet while you take warfarin  Warfarin works best when you eat about the same amount of vitamin K every day  Vitamin K is found in green leafy vegetables and certain other foods  Ask for more information about what to eat when you are taking warfarin      § You will need to see your healthcare provider for follow-up visits when you are on warfarin  You will need regular blood tests  These tests are used to decide how much medicine you need  · Take your medicine as directed  Contact your healthcare provider if you think your medicine is not helping or if you have side effects  Tell him or her if you are allergic to any medicine  Keep a list of the medicines, vitamins, and herbs you take  Include the amounts, and when and why you take them  Bring the list or the pill bottles to follow-up visits  Carry your medicine list with you in case of an emergency  What you need to know about variants: The virus has changed into several new forms (called variants) since it was discovered  The variants may be more contagious (easily spread) than the original form  Some may also cause more severe illness than others  What you need to know about COVID-19 vaccines:  Healthcare providers recommend a COVID-19 vaccine, even if you have already had COVID-19  You are considered fully vaccinated against COVID-19 two weeks after the final dose of any COVID-19 vaccine  Let your healthcare provider know when you have received the final dose of the vaccine  Make a copy of your vaccination card  Keep the original with you in case you need to show it  Keep the copy in a safe place  · COVID-19 vaccines are given as a shot in 1 or 2 doses  Vaccination is recommended for everyone 5 years or older  One 2-dose vaccine is fully approved for those 12 or older  This vaccine also has an emergency use authorization (EUA) for children 11to 13years old  No vaccine is currently available for children younger than 5 years  A booster (additional) dose is given to help the immune system continue to protect against severe COVID-19     ? A booster is recommended for all adults 18 or older  The booster can be a different brand of the COVID-19 vaccine than you originally received   The timing for the booster depends on the type of vaccine you received:    § 1-dose vaccine: The booster is given at least 2 months after you received the vaccine  § 2-dose vaccine: The booster is given at least 6 months after the second dose   ? A booster can be given to adolescents 12to 16years old  Only 1 COVID-19 vaccine has an EUA for adolescent boosters  The booster is given at least 6 months after the second dose of the original vaccine series  Continue social distancing and other measures, even after you get the vaccine  Although it is not common, you can become infected after you get the vaccine  You may also be able to pass the virus to others without knowing you are infected  After you get the vaccine, check local, national, and international travel rules  You may need to be tested before you travel  Some countries require proof of a negative test before you travel  You may also need to quarantine after you return  How the 2019 coronavirus spreads:   · Droplets are the main way all coronaviruses spread  The virus travels in droplets that form when a person talks, sings, coughs, or sneezes  The droplets can also float in the air for minutes or hours  Infection happens when you breathe in the droplets or get them in your eyes or nose  Close personal contact with an infected person increases your risk for infection  This means being within 6 feet (2 meters) of the person for at least 15 minutes over 24 hours  · Person-to-person contact can spread the virus  For example, a person with the virus on his or her hands can spread it by shaking hands with someone  · The virus can stay on objects and surfaces for up to 3 days  You may become infected by touching the object or surface and then touching your eyes or mouth  Help lower the risk for COVID-19:   · Wash your hands often throughout the day  Use soap and water  Rub your soapy hands together, lacing your fingers, for at least 20 seconds   Rinse with warm, running water  Dry your hands with a clean towel or paper towel  Use hand  that contains alcohol if soap and water are not available  Teach children how to wash their hands and use hand   · Cover sneezes and coughs  Turn your face away and cover your mouth and nose with a tissue  Throw the tissue away  Use the bend of your arm if a tissue is not available  Then wash your hands well with soap and water or use hand   Teach children how to cover a cough or sneeze  · Wear a face covering (mask) when needed  Use a cloth covering with at least 2 layers  You can also create layers by putting a cloth covering over a disposable non-medical mask  Cover your mouth and your nose  · Follow worldwide, national, and local social distancing guidelines  Keep at least 6 feet (2 meters) between you and others  · Try not to touch your face  If you get the virus on your hands, you can transfer it to your eyes, nose, or mouth and become infected  You can also transfer it to objects, surfaces, or people  · Clean and disinfect high-touch surfaces and objects often  Use disinfecting wipes, or make a solution of 4 teaspoons of bleach in 1 quart (4 cups) of water  · Ask about other vaccines you may need  Get the influenza (flu) vaccine as soon as recommended each year, usually starting in September or October  Get the pneumonia vaccine if recommended  Your healthcare provider can tell you if you should also get other vaccines, and when to get them  Follow social distancing guidelines:  National and local social distancing rules vary  Rules and restrictions may change over time as restrictions are lifted  The following are general guidelines:  · Stay home if you are sick or think you may have COVID-19  It is important to stay home if you are waiting for a testing appointment or for test results  · Avoid close physical contact with anyone who does not live in your home    Do not shake hands with, hug, or kiss a person as a greeting  If you must use public transportation (such as a bus or subway), try to sit or stand away from others  Wear your face covering  · Avoid in-person gatherings and crowds  Attend virtually if possible  Follow up with your doctor as directed:  Write down your questions so you remember to ask them during your visits  For more information:   · Centers for Disease Control and Prevention  [address] , [address]  Phone: 6- 157 - 662-6491  Web Address: [employer] com br    © Copyright [address] 2022 Information is for End User's use only and may not be sold, redistributed or otherwise used for commercial purposes  All illustrations and images included in CareNotes® are the copyrighted property of A D A M [employer]  or Joyce Thomas   The above information is an  only  It is not intended as medical advice for individual conditions or treatments  Talk to your doctor, nurse or pharmacist before following any medical regimen to see if it is safe and effective for you  To present to the ER if symptoms worsen  Chief Complaint     Chief Complaint   Patient presents with    Nasal Congestion     started last night, scratchy throat     Headache    Chills         History of Present Illness   Yoly Salazar presents to the clinic c/o    URI   This is a new problem  The current episode started yesterday  The problem has been unchanged  There has been no fever  Associated symptoms include congestion, coughing and headaches  Pertinent negatives include no abdominal pain, chest pain, diarrhea, ear pain, nausea, rash, sore throat (scratchy), vomiting or wheezing  He has tried acetaminophen (sudafed) for the symptoms  The treatment provided no relief  Review of Systems   Review of Systems   Constitutional: Positive for chills  Negative for diaphoresis, fatigue and fever  HENT: Positive for congestion   Negative for ear discharge, ear pain, facial swelling and sore throat (scratchy)  Eyes: Negative for photophobia, pain, discharge, redness, itching and visual disturbance  Respiratory: Positive for cough  Negative for apnea, chest tightness, shortness of breath and wheezing  Cardiovascular: Negative for chest pain and palpitations  Gastrointestinal: Negative for abdominal pain, constipation, diarrhea, nausea and vomiting  Skin: Negative for color change, rash and wound  Neurological: Positive for headaches  Negative for dizziness  Hematological: Negative for adenopathy  Current Medications     Long-Term Medications   Medication Sig Dispense Refill    atorvastatin (LIPITOR) 10 mg tablet Take 1 tablet (10 mg total) by mouth daily 90 tablet 1    Omega-3 Fatty Acids (FISH OIL) 1,000 mg Take 1,000 mg by mouth daily (Patient not taking: Reported on 3/24/2022 )         Current Allergies     Allergies as of 03/24/2022 - Reviewed 03/24/2022   Allergen Reaction Noted    Gluten meal - food allergy  03/23/2016            The following portions of the patient's history were reviewed and updated as appropriate: allergies, current medications, past family history, past medical history, past social history, past surgical history and problem list   History reviewed  No pertinent past medical history  History reviewed  No pertinent surgical history    Social History     Socioeconomic History    Marital status: /Civil Union     Spouse name: Not on file    Number of children: Not on file    Years of education: Not on file    Highest education level: Not on file   Occupational History    Not on file   Tobacco Use    Smoking status: Light Tobacco Smoker     Types: Cigars    Smokeless tobacco: Current User    Tobacco comment: occasional cigar   Vaping Use    Vaping Use: Not on file   Substance and Sexual Activity    Alcohol use: Yes     Comment: social    Drug use: No    Sexual activity: Not on file   Other Topics Concern    Not on file   Social History Narrative    Not on file     Social Determinants of Health     Financial Resource Strain: Not on file   Food Insecurity: Not on file   Transportation Needs: Not on file   Physical Activity: Not on file   Stress: Not on file   Social Connections: Not on file   Intimate Partner Violence: Not on file   Housing Stability: Not on file       Objective   Pulse 84   Temp (!) 97 1 °F (36 2 °C) (Temporal)   Resp 18   Ht 5' 10" (1 778 m)   Wt 103 kg (228 lb)   SpO2 96%   BMI 32 71 kg/m²      Physical Exam     Physical Exam  Vitals and nursing note reviewed  Constitutional:       General: He is not in acute distress  Appearance: He is well-developed  He is not diaphoretic  HENT:      Head: Normocephalic and atraumatic  Right Ear: External ear normal       Left Ear: External ear normal       Nose: Nose normal    Eyes:      General: No scleral icterus  Right eye: No discharge  Left eye: No discharge  Conjunctiva/sclera: Conjunctivae normal    Cardiovascular:      Rate and Rhythm: Normal rate and regular rhythm  Heart sounds: Normal heart sounds  No murmur heard  No friction rub  No gallop  Pulmonary:      Effort: Pulmonary effort is normal  No respiratory distress  Breath sounds: Normal breath sounds  No decreased breath sounds, wheezing, rhonchi or rales  Skin:     General: Skin is warm and dry  Coloration: Skin is not pale  Findings: No erythema or rash  Neurological:      Mental Status: He is alert and oriented to person, place, and time  Psychiatric:         Behavior: Behavior normal          Thought Content:  Thought content normal          Judgment: Judgment normal          Emeli Boo PA-C

## 2022-03-24 NOTE — PATIENT INSTRUCTIONS
COVID-19 (Coronavirus Disease 2019)   WHAT YOU NEED TO KNOW:   COVID-19 is the disease caused by a coronavirus first discovered in December 2019  Coronaviruses generally cause upper respiratory (nose, throat, and lung) infections, such as a cold  The 2019 virus spreads quickly and easily  It can be spread starting 2 to 3 days before symptoms even begin  DISCHARGE INSTRUCTIONS:   Call your local emergency number (911 in the [address] [facility]) if:   · You have trouble breathing or shortness of breath at rest     · You have chest pain or pressure that lasts longer than 5 minutes  · You become confused or hard to wake  · Your lips or face are blue  Seek care immediately if:   · You have a fever of 104°F (40°C) or higher  Call your doctor if:   · You have symptoms of COVID-19  · You have questions or concerns about your condition or care  Medicines: You may need any of the following:  · Decongestants  help reduce nasal congestion and help you breathe more easily  If you take decongestant pills, they may make you feel restless or cause problems with your sleep  Do not use decongestant sprays for more than a few days  · Cough suppressants  help reduce coughing  Ask your healthcare provider which type of cough medicine is best for you  · Acetaminophen  decreases pain and fever  It is available without a doctor's order  Ask how much to take and how often to take it  Follow directions  Read the labels of all other medicines you are using to see if they also contain acetaminophen, or ask your doctor or pharmacist  Acetaminophen can cause liver damage if not taken correctly  Do not use more than 4 grams (4,000 milligrams) total of acetaminophen in one day  · NSAIDs , such as ibuprofen, help decrease swelling, pain, and fever  This medicine is available with or without a doctor's order  NSAIDs can cause stomach bleeding or kidney problems in certain people   If you take blood thinner medicine, always ask your healthcare provider if NSAIDs are safe for you  Always read the medicine label and follow directions  · Blood thinners  help prevent blood clots  Clots can cause strokes, heart attacks, and death  The following are general safety guidelines to follow while you are taking a blood thinner:    ? Watch for bleeding and bruising while you take blood thinners  Watch for bleeding from your gums or nose  Watch for blood in your urine and bowel movements  Use a soft washcloth on your skin, and a soft toothbrush to brush your teeth  This can keep your skin and gums from bleeding  If you shave, use an electric shaver  Do not play contact sports  ? Tell your dentist and other healthcare providers that you take a blood thinner  Wear a bracelet or necklace that says you take this medicine  ? Do not start or stop any other medicines unless your healthcare provider tells you to  Many medicines cannot be used with blood thinners  ? Take your blood thinner exactly as prescribed by your healthcare provider  Do not skip does or take less than prescribed  Tell your provider right away if you forget to take your blood thinner, or if you take too much  ? Warfarin  is a blood thinner that you may need to take  The following are things you should be aware of if you take warfarin:     § Foods and medicines can affect the amount of warfarin in your blood  Do not make major changes to your diet while you take warfarin  Warfarin works best when you eat about the same amount of vitamin K every day  Vitamin K is found in green leafy vegetables and certain other foods  Ask for more information about what to eat when you are taking warfarin  § You will need to see your healthcare provider for follow-up visits when you are on warfarin  You will need regular blood tests  These tests are used to decide how much medicine you need  · Take your medicine as directed    Contact your healthcare provider if you think your medicine is not helping or if you have side effects  Tell him or her if you are allergic to any medicine  Keep a list of the medicines, vitamins, and herbs you take  Include the amounts, and when and why you take them  Bring the list or the pill bottles to follow-up visits  Carry your medicine list with you in case of an emergency  What you need to know about variants: The virus has changed into several new forms (called variants) since it was discovered  The variants may be more contagious (easily spread) than the original form  Some may also cause more severe illness than others  What you need to know about COVID-19 vaccines:  Healthcare providers recommend a COVID-19 vaccine, even if you have already had COVID-19  You are considered fully vaccinated against COVID-19 two weeks after the final dose of any COVID-19 vaccine  Let your healthcare provider know when you have received the final dose of the vaccine  Make a copy of your vaccination card  Keep the original with you in case you need to show it  Keep the copy in a safe place  · COVID-19 vaccines are given as a shot in 1 or 2 doses  Vaccination is recommended for everyone 5 years or older  One 2-dose vaccine is fully approved for those 12 or older  This vaccine also has an emergency use authorization (EUA) for children 11to 13years old  No vaccine is currently available for children younger than 5 years  A booster (additional) dose is given to help the immune system continue to protect against severe COVID-19     ? A booster is recommended for all adults 18 or older  The booster can be a different brand of the COVID-19 vaccine than you originally received  The timing for the booster depends on the type of vaccine you received:    § 1-dose vaccine: The booster is given at least 2 months after you received the vaccine  § 2-dose vaccine: The booster is given at least 6 months after the second dose   ?  A booster can be given to adolescents 16 to 17 years old   Only 1 COVID-19 vaccine has an EUA for adolescent boosters  The booster is given at least 6 months after the second dose of the original vaccine series  Continue social distancing and other measures, even after you get the vaccine  Although it is not common, you can become infected after you get the vaccine  You may also be able to pass the virus to others without knowing you are infected  After you get the vaccine, check local, national, and international travel rules  You may need to be tested before you travel  Some countries require proof of a negative test before you travel  You may also need to quarantine after you return  How the 2019 coronavirus spreads:   · Droplets are the main way all coronaviruses spread  The virus travels in droplets that form when a person talks, sings, coughs, or sneezes  The droplets can also float in the air for minutes or hours  Infection happens when you breathe in the droplets or get them in your eyes or nose  Close personal contact with an infected person increases your risk for infection  This means being within 6 feet (2 meters) of the person for at least 15 minutes over 24 hours  · Person-to-person contact can spread the virus  For example, a person with the virus on his or her hands can spread it by shaking hands with someone  · The virus can stay on objects and surfaces for up to 3 days  You may become infected by touching the object or surface and then touching your eyes or mouth  Help lower the risk for COVID-19:   · Wash your hands often throughout the day  Use soap and water  Rub your soapy hands together, lacing your fingers, for at least 20 seconds  Rinse with warm, running water  Dry your hands with a clean towel or paper towel  Use hand  that contains alcohol if soap and water are not available  Teach children how to wash their hands and use hand   · Cover sneezes and coughs    Turn your face away and cover your mouth and nose with a tissue  Throw the tissue away  Use the bend of your arm if a tissue is not available  Then wash your hands well with soap and water or use hand   Teach children how to cover a cough or sneeze  · Wear a face covering (mask) when needed  Use a cloth covering with at least 2 layers  You can also create layers by putting a cloth covering over a disposable non-medical mask  Cover your mouth and your nose  · Follow worldwide, national, and local social distancing guidelines  Keep at least 6 feet (2 meters) between you and others  · Try not to touch your face  If you get the virus on your hands, you can transfer it to your eyes, nose, or mouth and become infected  You can also transfer it to objects, surfaces, or people  · Clean and disinfect high-touch surfaces and objects often  Use disinfecting wipes, or make a solution of 4 teaspoons of bleach in 1 quart (4 cups) of water  · Ask about other vaccines you may need  Get the influenza (flu) vaccine as soon as recommended each year, usually starting in September or October  Get the pneumonia vaccine if recommended  Your healthcare provider can tell you if you should also get other vaccines, and when to get them  Follow social distancing guidelines:  National and local social distancing rules vary  Rules and restrictions may change over time as restrictions are lifted  The following are general guidelines:  · Stay home if you are sick or think you may have COVID-19  It is important to stay home if you are waiting for a testing appointment or for test results  · Avoid close physical contact with anyone who does not live in your home  Do not shake hands with, hug, or kiss a person as a greeting  If you must use public transportation (such as a bus or subway), try to sit or stand away from others  Wear your face covering  · Avoid in-person gatherings and crowds  Attend virtually if possible      Follow up with your doctor as directed:  Write down your questions so you remember to ask them during your visits  For more information:   · Centers for Disease Control and Prevention  [address] , [address]  Phone: 2- 225 - 743-0243  Web Address: [employer] com     © Copyright [employer] 2022 Information is for End User's use only and may not be sold, redistributed or otherwise used for commercial purposes  All illustrations and images included in CareNotes® are the copyrighted property of [employer]  or [facility]   The above information is an  only  It is not intended as medical advice for individual conditions or treatments  Talk to your doctor, nurse or pharmacist before following any medical regimen to see if it is safe and effective for you

## 2022-03-25 LAB
FLUAV RNA RESP QL NAA+PROBE: NEGATIVE
FLUBV RNA RESP QL NAA+PROBE: NEGATIVE
SARS-COV-2 RNA RESP QL NAA+PROBE: POSITIVE

## 2022-08-22 ENCOUNTER — OFFICE VISIT (OUTPATIENT)
Dept: FAMILY MEDICINE CLINIC | Facility: CLINIC | Age: 47
End: 2022-08-22
Payer: COMMERCIAL

## 2022-08-22 VITALS
TEMPERATURE: 97.8 F | HEART RATE: 90 BPM | BODY MASS INDEX: 34.33 KG/M2 | SYSTOLIC BLOOD PRESSURE: 144 MMHG | OXYGEN SATURATION: 96 % | HEIGHT: 70 IN | WEIGHT: 239.8 LBS | DIASTOLIC BLOOD PRESSURE: 98 MMHG

## 2022-08-22 DIAGNOSIS — Z11.59 ENCOUNTER FOR HEPATITIS C SCREENING TEST FOR LOW RISK PATIENT: ICD-10-CM

## 2022-08-22 DIAGNOSIS — I10 PRIMARY HYPERTENSION: Primary | ICD-10-CM

## 2022-08-22 DIAGNOSIS — R53.83 OTHER FATIGUE: ICD-10-CM

## 2022-08-22 DIAGNOSIS — R06.09 DYSPNEA ON EXERTION: ICD-10-CM

## 2022-08-22 DIAGNOSIS — Z12.11 SCREENING FOR COLON CANCER: ICD-10-CM

## 2022-08-22 DIAGNOSIS — E78.2 MIXED HYPERLIPIDEMIA: ICD-10-CM

## 2022-08-22 DIAGNOSIS — R68.89 HEAT INTOLERANCE: ICD-10-CM

## 2022-08-22 DIAGNOSIS — Z11.4 SCREENING FOR HIV (HUMAN IMMUNODEFICIENCY VIRUS): ICD-10-CM

## 2022-08-22 PROBLEM — Z13.220 NEED FOR LIPID SCREENING: Status: RESOLVED | Noted: 2018-10-25 | Resolved: 2022-08-22

## 2022-08-22 PROBLEM — M10.9 ACUTE GOUTY ARTHROPATHY: Status: RESOLVED | Noted: 2017-10-13 | Resolved: 2022-08-22

## 2022-08-22 PROBLEM — L24.89 IRRITANT CONTACT DERMATITIS DUE TO OTHER AGENTS: Status: RESOLVED | Noted: 2018-02-15 | Resolved: 2022-08-22

## 2022-08-22 PROBLEM — R06.83 SNORING: Status: RESOLVED | Noted: 2018-11-08 | Resolved: 2022-08-22

## 2022-08-22 PROBLEM — E78.5 DYSLIPIDEMIA: Status: RESOLVED | Noted: 2018-11-08 | Resolved: 2022-08-22

## 2022-08-22 PROBLEM — G47.19 EXCESSIVE DAYTIME SLEEPINESS: Status: RESOLVED | Noted: 2018-11-08 | Resolved: 2022-08-22

## 2022-08-22 PROBLEM — B34.9 VIRAL INFECTION, UNSPECIFIED: Status: RESOLVED | Noted: 2021-04-02 | Resolved: 2022-08-22

## 2022-08-22 PROBLEM — G47.30 SLEEP APNEA: Status: RESOLVED | Noted: 2018-12-19 | Resolved: 2022-08-22

## 2022-08-22 PROBLEM — Z20.822 EXPOSURE TO COVID-19 VIRUS: Status: RESOLVED | Noted: 2021-04-02 | Resolved: 2022-08-22

## 2022-08-22 PROBLEM — Z00.00 ANNUAL PHYSICAL EXAM: Status: RESOLVED | Noted: 2018-10-25 | Resolved: 2022-08-22

## 2022-08-22 PROBLEM — R07.9 CHEST PAIN: Status: RESOLVED | Noted: 2018-10-25 | Resolved: 2022-08-22

## 2022-08-22 PROCEDURE — 3725F SCREEN DEPRESSION PERFORMED: CPT | Performed by: FAMILY MEDICINE

## 2022-08-22 PROCEDURE — 93000 ELECTROCARDIOGRAM COMPLETE: CPT | Performed by: FAMILY MEDICINE

## 2022-08-22 PROCEDURE — 99214 OFFICE O/P EST MOD 30 MIN: CPT | Performed by: FAMILY MEDICINE

## 2022-08-22 RX ORDER — LISINOPRIL 20 MG/1
20 TABLET ORAL DAILY
Qty: 30 TABLET | Refills: 1 | Status: SHIPPED | OUTPATIENT
Start: 2022-08-22

## 2022-08-22 NOTE — PROGRESS NOTES
Danny Roldan 1975 male MRN: [id number]      ASSESSMENT/PLAN  Problem List Items Addressed This Visit        Cardiovascular and Mediastinum    Primary hypertension - Primary    Relevant Medications    lisinopril (ZESTRIL) 20 mg tablet    Other Relevant Orders    Comprehensive metabolic panel    Lipid panel    TSH, 3rd generation with Free T4 reflex    CBC and differential    POCT ECG (Completed)       Other    Hyperlipidemia    Relevant Orders    Comprehensive metabolic panel    Lipid panel      Other Visit Diagnoses     Other fatigue        Relevant Orders    Comprehensive metabolic panel    Lipid panel    TSH, 3rd generation with Free T4 reflex    CBC and differential    Heat intolerance        Relevant Orders    Comprehensive metabolic panel    Lipid panel    TSH, 3rd generation with Free T4 reflex    CBC and differential    Dyspnea on exertion        Relevant Orders    Comprehensive metabolic panel    Lipid panel    TSH, 3rd generation with Free T4 reflex    CBC and differential    Screening for colon cancer        Relevant Orders    Ambulatory referral for colonoscopy    Screening for HIV (human immunodeficiency virus)        Relevant Orders    HIV 1/2 Antigen/Antibody (4th Generation) w Reflex [facility]    Encounter for hepatitis C screening test for low risk patient        Relevant Orders    Hepatitis C antibody        BP above goal in office and at home  In office EKG shows L axis deviation, otherwise benign  Will start Lisinopril and f/u in 1-2 weeks to monitor  Reviewed possible ADRs including cough, hypotension  Update labs as above prior to r/o anemia, thyroid dysfunction, vitamin deficiency, kidney/liver concern, and screen for DM, update lipids  CRC DUE -- agreeable to colonoscopy       No future appointments  SUBJECTIVE  CC: Follow-up (Elevated blood pressure) and Headache      HPI:  Danny Roldan is a 56 y o  male who presents due to concern for high BP       Has been having "slight" headaches, "hot flashes" and "sweats", feels tired/sluggish, dyspnea on exertion (taking the garbage to the curb this morning)   Home BPs 160s/100s at highest      Review of Systems   Constitutional: Positive for diaphoresis and fatigue  Respiratory: Positive for chest tightness and shortness of breath  Cardiovascular: Negative for chest pain, palpitations and leg swelling  Endocrine: Positive for heat intolerance  Neurological: Positive for headaches  Historical Information   The patient history was reviewed and updated as follows:    Past Medical History:   Diagnosis Date    Acute gouty arthropathy 10/13/2017     History reviewed  No pertinent surgical history  Family History   Problem Relation Age of Onset    Ovarian cancer Mother       Social History   Social History     Substance and Sexual Activity   Alcohol Use Yes    Comment: social     Social History     Substance and Sexual Activity   Drug Use No     Social History     Tobacco Use   Smoking Status Light Tobacco Smoker    Types: Cigars   Smokeless Tobacco Current User   Tobacco Comment    occasional cigar       Medications:     Current Outpatient Medications:     atorvastatin (LIPITOR) 10 mg tablet, Take 1 tablet (10 mg total) by mouth daily, Disp: 90 tablet, Rfl: 1    lisinopril (ZESTRIL) 20 mg tablet, Take 1 tablet (20 mg total) by mouth daily, Disp: 30 tablet, Rfl: 1  Allergies   Allergen Reactions    Gluten Meal - Food Allergy        OBJECTIVE    Vitals:   Vitals:    08/22/22 1038   BP: 144/98   BP Location: Left arm   Patient Position: Sitting   Pulse: 90   Temp: 97 8 °F (36 6 °C)   TempSrc: Temporal   SpO2: 96%   Weight: 109 kg (239 lb 12 8 oz)   Height: 5' 10" (1 778 m)           Physical Exam  Vitals and nursing note reviewed  Constitutional:       General: He is not in acute distress  Appearance: Normal appearance  HENT:      Head: Normocephalic and atraumatic        Right Ear: Tympanic membrane, ear canal and external ear normal       Left Ear: Tympanic membrane, ear canal and external ear normal       Nose: Nose normal       Mouth/Throat:      Mouth: Mucous membranes are moist       Pharynx: No oropharyngeal exudate or posterior oropharyngeal erythema  Eyes:      Conjunctiva/sclera: Conjunctivae normal    Cardiovascular:      Rate and Rhythm: Regular rhythm  Tachycardia present  Pulmonary:      Effort: Pulmonary effort is normal  No respiratory distress  Breath sounds: Normal breath sounds  Abdominal:      General: Bowel sounds are normal  There is no distension  Palpations: Abdomen is soft  Tenderness: There is no abdominal tenderness  Musculoskeletal:      Right lower leg: No edema  Left lower leg: No edema  Lymphadenopathy:      Cervical: No cervical adenopathy  Skin:     General: Skin is warm and dry  Neurological:      General: No focal deficit present  Mental Status: He is alert     Psychiatric:         Mood and Affect: Mood normal                     DO Ebonie Stephen [address] [facility]   8/22/2022  11:06 AM

## 2022-08-23 ENCOUNTER — APPOINTMENT (OUTPATIENT)
Dept: LAB | Facility: CLINIC | Age: 47
End: 2022-08-23
Payer: COMMERCIAL

## 2022-08-23 DIAGNOSIS — Z11.4 SCREENING FOR HIV (HUMAN IMMUNODEFICIENCY VIRUS): ICD-10-CM

## 2022-08-23 DIAGNOSIS — R53.83 OTHER FATIGUE: ICD-10-CM

## 2022-08-23 DIAGNOSIS — R68.89 HEAT INTOLERANCE: ICD-10-CM

## 2022-08-23 DIAGNOSIS — R06.09 DYSPNEA ON EXERTION: ICD-10-CM

## 2022-08-23 DIAGNOSIS — E78.2 MIXED HYPERLIPIDEMIA: ICD-10-CM

## 2022-08-23 DIAGNOSIS — R73.01 IFG (IMPAIRED FASTING GLUCOSE): ICD-10-CM

## 2022-08-23 DIAGNOSIS — Z11.59 ENCOUNTER FOR HEPATITIS C SCREENING TEST FOR LOW RISK PATIENT: ICD-10-CM

## 2022-08-23 DIAGNOSIS — I10 PRIMARY HYPERTENSION: ICD-10-CM

## 2022-08-23 LAB
ALBUMIN SERPL BCP-MCNC: 3.7 G/DL (ref 3.5–5)
ALP SERPL-CCNC: 81 U/L (ref 46–116)
ALT SERPL W P-5'-P-CCNC: 59 U/L (ref 12–78)
ANION GAP SERPL CALCULATED.3IONS-SCNC: 3 MMOL/L (ref 4–13)
AST SERPL W P-5'-P-CCNC: 37 U/L (ref 5–45)
BASOPHILS # BLD AUTO: 0.05 THOUSANDS/ΜL (ref 0–0.1)
BASOPHILS NFR BLD AUTO: 1 % (ref 0–1)
BILIRUB SERPL-MCNC: 0.38 MG/DL (ref 0.2–1)
BUN SERPL-MCNC: 12 MG/DL (ref 5–25)
CALCIUM SERPL-MCNC: 9.3 MG/DL (ref 8.3–10.1)
CHLORIDE SERPL-SCNC: 105 MMOL/L (ref 96–108)
CHOLEST SERPL-MCNC: 245 MG/DL
CO2 SERPL-SCNC: 28 MMOL/L (ref 21–32)
CREAT SERPL-MCNC: 1.22 MG/DL (ref 0.6–1.3)
EOSINOPHIL # BLD AUTO: 0.11 THOUSAND/ΜL (ref 0–0.61)
EOSINOPHIL NFR BLD AUTO: 2 % (ref 0–6)
ERYTHROCYTE [DISTWIDTH] IN BLOOD BY AUTOMATED COUNT: 13 % (ref 11.6–15.1)
GFR SERPL CREATININE-BSD FRML MDRD: 70 ML/MIN/1.73SQ M
GLUCOSE P FAST SERPL-MCNC: 106 MG/DL (ref 65–99)
HCT VFR BLD AUTO: 47 % (ref 36.5–49.3)
HCV AB SER QL: NORMAL
HDLC SERPL-MCNC: 41 MG/DL
HGB BLD-MCNC: 15.5 G/DL (ref 12–17)
IMM GRANULOCYTES # BLD AUTO: 0.03 THOUSAND/UL (ref 0–0.2)
IMM GRANULOCYTES NFR BLD AUTO: 1 % (ref 0–2)
LDLC SERPL CALC-MCNC: 139 MG/DL (ref 0–100)
LYMPHOCYTES # BLD AUTO: 1.79 THOUSANDS/ΜL (ref 0.6–4.47)
LYMPHOCYTES NFR BLD AUTO: 35 % (ref 14–44)
MCH RBC QN AUTO: 31.1 PG (ref 26.8–34.3)
MCHC RBC AUTO-ENTMCNC: 33 G/DL (ref 31.4–37.4)
MCV RBC AUTO: 94 FL (ref 82–98)
MONOCYTES # BLD AUTO: 0.47 THOUSAND/ΜL (ref 0.17–1.22)
MONOCYTES NFR BLD AUTO: 9 % (ref 4–12)
NEUTROPHILS # BLD AUTO: 2.68 THOUSANDS/ΜL (ref 1.85–7.62)
NEUTS SEG NFR BLD AUTO: 52 % (ref 43–75)
NONHDLC SERPL-MCNC: 204 MG/DL
NRBC BLD AUTO-RTO: 0 /100 WBCS
PLATELET # BLD AUTO: 275 THOUSANDS/UL (ref 149–390)
PMV BLD AUTO: 10.4 FL (ref 8.9–12.7)
POTASSIUM SERPL-SCNC: 4.4 MMOL/L (ref 3.5–5.3)
PROT SERPL-MCNC: 7.6 G/DL (ref 6.4–8.4)
RBC # BLD AUTO: 4.99 MILLION/UL (ref 3.88–5.62)
SODIUM SERPL-SCNC: 136 MMOL/L (ref 135–147)
TRIGL SERPL-MCNC: 327 MG/DL
TSH SERPL DL<=0.05 MIU/L-ACNC: 3.6 UIU/ML (ref 0.45–4.5)
WBC # BLD AUTO: 5.13 THOUSAND/UL (ref 4.31–10.16)

## 2022-08-23 PROCEDURE — 84443 ASSAY THYROID STIM HORMONE: CPT

## 2022-08-23 PROCEDURE — 36415 COLL VENOUS BLD VENIPUNCTURE: CPT

## 2022-08-23 PROCEDURE — 86803 HEPATITIS C AB TEST: CPT

## 2022-08-23 PROCEDURE — 83036 HEMOGLOBIN GLYCOSYLATED A1C: CPT

## 2022-08-23 PROCEDURE — 80053 COMPREHEN METABOLIC PANEL: CPT

## 2022-08-23 PROCEDURE — 87389 HIV-1 AG W/HIV-1&-2 AB AG IA: CPT

## 2022-08-23 PROCEDURE — 85025 COMPLETE CBC W/AUTO DIFF WBC: CPT

## 2022-08-23 PROCEDURE — 80061 LIPID PANEL: CPT

## 2022-08-24 ENCOUNTER — TELEPHONE (OUTPATIENT)
Dept: FAMILY MEDICINE CLINIC | Facility: CLINIC | Age: 47
End: 2022-08-24

## 2022-08-24 DIAGNOSIS — E78.00 PURE HYPERCHOLESTEROLEMIA: ICD-10-CM

## 2022-08-24 DIAGNOSIS — R73.01 IFG (IMPAIRED FASTING GLUCOSE): Primary | ICD-10-CM

## 2022-08-24 LAB — HIV 1+2 AB+HIV1 P24 AG SERPL QL IA: NORMAL

## 2022-08-24 RX ORDER — ATORVASTATIN CALCIUM 20 MG/1
20 TABLET, FILM COATED ORAL DAILY
Qty: 90 TABLET | Refills: 1 | Status: SHIPPED | OUTPATIENT
Start: 2022-08-24 | End: 2023-03-06

## 2022-08-24 NOTE — TELEPHONE ENCOUNTER
Yes -- will increase to 20, script sent   Should repeat labs in 3 months to monitor, they are ordered

## 2022-08-24 NOTE — TELEPHONE ENCOUNTER
Patient got your [employer] message and is fine with increasing the dosage of Lipitor - are you increasing to 20mg?  If so, I'll send over to pharmacy

## 2022-08-25 LAB
EST. AVERAGE GLUCOSE BLD GHB EST-MCNC: 111 MG/DL
HBA1C MFR BLD: 5.5 %

## 2022-10-19 ENCOUNTER — TELEPHONE (OUTPATIENT)
Dept: FAMILY MEDICINE CLINIC | Facility: CLINIC | Age: 47
End: 2022-10-19

## 2022-10-19 NOTE — TELEPHONE ENCOUNTER
Recommend an appt to be evaluated any provider  He can take ibuprofen  mg, 3-4 capsules (600 to 800) mg with food 2-3 times per day as needed

## 2022-10-19 NOTE — TELEPHONE ENCOUNTER
Pt has gout on left toe & foot and it is angi painful to walk   inhaled corticosteroids asking if Dr would prescribe med for him or does he need an appt ? ??

## 2022-10-20 ENCOUNTER — RA CDI HCC (OUTPATIENT)
Dept: OTHER | Facility: HOSPITAL | Age: 47
End: 2022-10-20

## 2022-10-20 NOTE — PROGRESS NOTES
[facility]  coding opportunities       Chart reviewed, no opportunity found: CHART REVIEWED, NO OPPORTUNITY FOUND        Patients Insurance        Commercial Insurance: [address] [place] [address]

## 2022-10-21 ENCOUNTER — OFFICE VISIT (OUTPATIENT)
Dept: FAMILY MEDICINE CLINIC | Facility: CLINIC | Age: 47
End: 2022-10-21
Payer: COMMERCIAL

## 2022-10-21 ENCOUNTER — APPOINTMENT (OUTPATIENT)
Dept: LAB | Facility: CLINIC | Age: 47
End: 2022-10-21
Payer: COMMERCIAL

## 2022-10-21 VITALS
HEART RATE: 88 BPM | DIASTOLIC BLOOD PRESSURE: 102 MMHG | HEIGHT: 70 IN | BODY MASS INDEX: 34.22 KG/M2 | SYSTOLIC BLOOD PRESSURE: 142 MMHG | WEIGHT: 239 LBS | OXYGEN SATURATION: 99 % | TEMPERATURE: 98.4 F

## 2022-10-21 DIAGNOSIS — I10 PRIMARY HYPERTENSION: ICD-10-CM

## 2022-10-21 DIAGNOSIS — M1A.9XX0 CHRONIC GOUT WITHOUT TOPHUS, UNSPECIFIED CAUSE, UNSPECIFIED SITE: ICD-10-CM

## 2022-10-21 DIAGNOSIS — Z23 ENCOUNTER FOR IMMUNIZATION: ICD-10-CM

## 2022-10-21 DIAGNOSIS — M1A.9XX0 CHRONIC GOUT WITHOUT TOPHUS, UNSPECIFIED CAUSE, UNSPECIFIED SITE: Primary | ICD-10-CM

## 2022-10-21 LAB — URATE SERPL-MCNC: 9 MG/DL (ref 3.5–8.5)

## 2022-10-21 PROCEDURE — 90471 IMMUNIZATION ADMIN: CPT

## 2022-10-21 PROCEDURE — 84550 ASSAY OF BLOOD/URIC ACID: CPT

## 2022-10-21 PROCEDURE — 99214 OFFICE O/P EST MOD 30 MIN: CPT | Performed by: FAMILY MEDICINE

## 2022-10-21 PROCEDURE — 90715 TDAP VACCINE 7 YRS/> IM: CPT

## 2022-10-21 PROCEDURE — 36415 COLL VENOUS BLD VENIPUNCTURE: CPT

## 2022-10-21 RX ORDER — IBUPROFEN 800 MG/1
800 TABLET ORAL EVERY 8 HOURS PRN
Qty: 30 TABLET | Refills: 2 | Status: SHIPPED | OUTPATIENT
Start: 2022-10-21

## 2022-10-21 RX ORDER — COLCHICINE 0.6 MG/1
0.6 TABLET ORAL DAILY
Qty: 3 TABLET | Refills: 3 | Status: SHIPPED | OUTPATIENT
Start: 2022-10-21 | End: 2022-10-24

## 2022-10-21 NOTE — PROGRESS NOTES
Name: Minda Soriano      : 1975      MRN: [id number]  Encounter Provider: Anne Dunne MD  Encounter Date: 10/21/2022   Encounter department: [address] [place] [address]     1  Chronic gout without tophus, unspecified cause, unspecified site  After discussing risks and benefits of medication along with side effects will start the following:  -     ibuprofen (MOTRIN) 800 mg tablet; Take 1 tablet (800 mg total) by mouth every 8 (eight) hours as needed for mild pain  -     colchicine (COLCRYS) 0 6 mg tablet; Take 1 tablet (0 6 mg total) by mouth daily for 3 days  -     Uric acid; Future    2  Primary hypertension  Elevated today  Advise to measure BP daily if elevated above 140/90 consider increasing medication dose  3  Encounter for immunization  -     TDAP VACCINE GREATER THAN OR EQUAL TO 6YO IM    Follow up in 6 months or as needed       Subjective     Patient is here because he has been having bilateral toe pain  Very painful in nature, red and swollen denies any injuries to his toe  His BP was elevated  He did not take his medications today  Denies any symptoms  Review of Systems   Constitutional: Negative for activity change, appetite change, fatigue and fever  HENT: Negative for congestion and ear discharge  Respiratory: Negative for cough and shortness of breath  Cardiovascular: Negative for chest pain and palpitations  Gastrointestinal: Negative for diarrhea and nausea  Musculoskeletal: Positive for arthralgias and joint swelling  Negative for back pain  Skin: Negative for color change and rash  Neurological: Negative for dizziness and headaches  Psychiatric/Behavioral: Negative for agitation and behavioral problems  Past Medical History:   Diagnosis Date   • Acute gouty arthropathy 10/13/2017     No past surgical history on file    Family History   Problem Relation Age of Onset   • Ovarian cancer Mother      Social History Socioeconomic History   • Marital status: Single     Spouse name: None   • Number of children: None   • Years of education: None   • Highest education level: None   Occupational History   • None   Tobacco Use   • Smoking status: Light Tobacco Smoker     Types: Cigars   • Smokeless tobacco: Current User   • Tobacco comment: occasional cigar   Vaping Use   • Vaping Use: None   Substance and Sexual Activity   • Alcohol use: Yes     Comment: social   • Drug use: No   • Sexual activity: None   Other Topics Concern   • None   Social History Narrative   • None     Social Determinants of Health     Financial Resource Strain: Not on file   Food Insecurity: Not on file   Transportation Needs: Not on file   Physical Activity: Not on file   Stress: Not on file   Social Connections: Not on file   Intimate Partner Violence: Not on file   Housing Stability: Not on file     Current Outpatient Medications on File Prior to Visit   Medication Sig   • atorvastatin (LIPITOR) 20 mg tablet Take 1 tablet (20 mg total) by mouth daily   • lisinopril (ZESTRIL) 20 mg tablet Take 1 tablet (20 mg total) by mouth daily     Allergies   Allergen Reactions   • Gluten Meal - Food Allergy      Immunization History   Administered Date(s) Administered   • COVID-19 PFIZER VACCINE 0 3 ML IM 10/06/2021, 10/27/2021   • Influenza Quadrivalent Preservative Free 3 years and older IM 10/28/2014   • Influenza, seasonal, injectable 10/10/2011, 10/26/2012       Objective     BP (!) 142/102 (BP Location: Left arm, Patient Position: Sitting, Cuff Size: Large)   Pulse 88   Temp 98 4 °F (36 9 °C)   Ht 5' 10" (1 778 m)   Wt 108 kg (239 lb)   SpO2 99%   BMI 34 29 kg/m²     Physical Exam  Constitutional:       General: He is not in acute distress  Appearance: He is well-developed  He is not diaphoretic  Eyes:      General: No scleral icterus  Pupils: Pupils are equal, round, and reactive to light     Cardiovascular:      Rate and Rhythm: Normal rate and regular rhythm  Heart sounds: Normal heart sounds  No murmur heard  Pulmonary:      Effort: Pulmonary effort is normal  No respiratory distress  Breath sounds: Normal breath sounds  No wheezing  Abdominal:      General: Bowel sounds are normal  There is no distension  Palpations: Abdomen is soft  Tenderness: There is no abdominal tenderness  Musculoskeletal:         General: Swelling and tenderness present  Comments: Right 1st toe tenderness to touch   Skin:     General: Skin is warm and dry  Findings: No rash  Neurological:      Mental Status: He is alert and oriented to person, place, and time         Judson Alfonso MD

## 2022-10-24 ENCOUNTER — TELEPHONE (OUTPATIENT)
Dept: FAMILY MEDICINE CLINIC | Facility: CLINIC | Age: 47
End: 2022-10-24

## 2022-10-24 DIAGNOSIS — M1A.9XX0 CHRONIC GOUT WITHOUT TOPHUS, UNSPECIFIED CAUSE, UNSPECIFIED SITE: Primary | ICD-10-CM

## 2022-10-24 RX ORDER — ALLOPURINOL 100 MG/1
100 TABLET ORAL DAILY
Qty: 30 TABLET | Refills: 2 | Status: SHIPPED | OUTPATIENT
Start: 2022-10-24 | End: 2022-11-23

## 2022-10-24 NOTE — TELEPHONE ENCOUNTER
Keyanna Oswald said he will take the allopurinol re his high uric acid level    please order for him

## 2022-10-31 DIAGNOSIS — I10 PRIMARY HYPERTENSION: ICD-10-CM

## 2022-10-31 RX ORDER — LISINOPRIL 20 MG/1
TABLET ORAL
Qty: 30 TABLET | Refills: 1 | Status: SHIPPED | OUTPATIENT
Start: 2022-10-31

## 2022-10-31 RX ORDER — LISINOPRIL 20 MG/1
20 TABLET ORAL DAILY
Qty: 30 TABLET | Refills: 1 | Status: SHIPPED | OUTPATIENT
Start: 2022-10-31 | End: 2022-10-31

## 2023-01-12 DIAGNOSIS — I10 PRIMARY HYPERTENSION: ICD-10-CM

## 2023-01-12 RX ORDER — LISINOPRIL 20 MG/1
20 TABLET ORAL DAILY
Qty: 30 TABLET | Refills: 0 | Status: SHIPPED | OUTPATIENT
Start: 2023-01-12

## 2023-02-15 DIAGNOSIS — M1A.9XX0 CHRONIC GOUT WITHOUT TOPHUS, UNSPECIFIED CAUSE, UNSPECIFIED SITE: ICD-10-CM

## 2023-02-15 RX ORDER — ALLOPURINOL 100 MG/1
100 TABLET ORAL DAILY
Qty: 30 TABLET | Refills: 2 | Status: SHIPPED | OUTPATIENT
Start: 2023-02-15 | End: 2023-03-17

## 2023-02-22 DIAGNOSIS — I10 PRIMARY HYPERTENSION: ICD-10-CM

## 2023-02-22 RX ORDER — LISINOPRIL 20 MG/1
20 TABLET ORAL DAILY
Qty: 30 TABLET | Refills: 0 | Status: SHIPPED | OUTPATIENT
Start: 2023-02-22

## 2023-03-04 DIAGNOSIS — E78.00 PURE HYPERCHOLESTEROLEMIA: ICD-10-CM

## 2023-03-06 RX ORDER — ATORVASTATIN CALCIUM 20 MG/1
TABLET, FILM COATED ORAL
Qty: 90 TABLET | Refills: 1 | Status: SHIPPED | OUTPATIENT
Start: 2023-03-06

## 2023-03-23 ENCOUNTER — VBI (OUTPATIENT)
Dept: ADMINISTRATIVE | Facility: OTHER | Age: 48
End: 2023-03-23

## 2023-03-28 DIAGNOSIS — I10 PRIMARY HYPERTENSION: ICD-10-CM

## 2023-03-28 RX ORDER — LISINOPRIL 20 MG/1
20 TABLET ORAL DAILY
Qty: 30 TABLET | Refills: 0 | Status: SHIPPED | OUTPATIENT
Start: 2023-03-28

## 2023-04-25 DIAGNOSIS — I10 PRIMARY HYPERTENSION: ICD-10-CM

## 2023-04-25 RX ORDER — LISINOPRIL 20 MG/1
20 TABLET ORAL DAILY
Qty: 30 TABLET | Refills: 0 | Status: SHIPPED | OUTPATIENT
Start: 2023-04-25

## 2023-05-14 DIAGNOSIS — M1A.9XX0 CHRONIC GOUT WITHOUT TOPHUS, UNSPECIFIED CAUSE, UNSPECIFIED SITE: ICD-10-CM

## 2023-05-15 ENCOUNTER — AMB VIDEO VISIT (OUTPATIENT)
Dept: OTHER | Facility: HOSPITAL | Age: 48
End: 2023-05-15

## 2023-05-15 DIAGNOSIS — M10.9 ACUTE GOUT OF RIGHT FOOT, UNSPECIFIED CAUSE: Primary | ICD-10-CM

## 2023-05-15 RX ORDER — METHYLPREDNISOLONE 4 MG/1
TABLET ORAL
Qty: 21 TABLET | Refills: 0 | Status: SHIPPED | OUTPATIENT
Start: 2023-05-15

## 2023-05-15 RX ORDER — ALLOPURINOL 100 MG/1
TABLET ORAL
Qty: 30 TABLET | Refills: 2 | Status: SHIPPED | OUTPATIENT
Start: 2023-05-15

## 2023-05-15 NOTE — LETTER
May 15, 2023     Tianna Signs    Patient: Tianna Signs   YOB: 1975   Date of Visit: 5/15/2023     [address] [facility]  [address]  [place], [place] [address]    May 15, 2023     Patient: Tianna Ontiveros   YOB: 1975   Date of Visit: 5/15/2023       To Whom it May Concern:    Kenyon Pond is under my professional care  He was seen virtually on 5/15/2023  He may return to work on 05/18/2023  If you have any questions or concerns, please don't hesitate to call           Sincerely,          BRITTNI Lance        CC: No Recipients

## 2023-05-16 ENCOUNTER — AMB VIDEO VISIT (OUTPATIENT)
Dept: OTHER | Facility: HOSPITAL | Age: 48
End: 2023-05-16

## 2023-05-16 NOTE — PATIENT INSTRUCTIONS
Will try medrol dose pack  Ice every 3-4 hours  Tylenol as needed  Avoid foods that will trigger gout  Follow up with PCP go to ER with worsening symptoms  Gout   WHAT YOU NEED TO KNOW:   Gout is a form of arthritis that causes severe joint pain, redness, swelling, and stiffness  Acute gout pain starts suddenly, gets worse quickly, and stops on its own  Acute gout can become chronic and cause permanent damage to the joints  DISCHARGE INSTRUCTIONS:   Return to the emergency department if:   You have severe pain in one or more of your joints that you cannot tolerate  You have a fever or redness that spreads beyond the joint area  Call your doctor if:   You have new symptoms, such as a rash, after you start gout treatment  Your joint pain and swelling do not go away, even after treatment  You are not urinating as much or as often as you usually do  You have trouble taking your gout medicines  You have questions or concerns about your condition or care  Medicines: You may need any of the following:  Prescription pain medicine  may be given  Ask your healthcare provider how to take this medicine safely  Some prescription pain medicines contain acetaminophen  Do not take other medicines that contain acetaminophen without talking to your healthcare provider  Too much acetaminophen may cause liver damage  Prescription pain medicine may cause constipation  Ask your healthcare provider how to prevent or treat constipation  NSAIDs , such as ibuprofen, help decrease swelling, pain, and fever  This medicine is available with or without a doctor's order  NSAIDs can cause stomach bleeding or kidney problems in certain people  If you take blood thinner medicine, always ask your healthcare provider if NSAIDs are safe for you  Always read the medicine label and follow directions  Gout medicine  decreases joint pain and swelling  It may also be given to prevent new gout attacks      Steroids reduce inflammation and can help your joint stiffness and pain during gout attacks  Uric acid medicine  may be given to reduce the amount of uric acid your body makes  Some medicines may help you pass more uric acid when you urinate  Take your medicine as directed  Contact your healthcare provider if you think your medicine is not helping or if you have side effects  Tell your provider if you are allergic to any medicine  Keep a list of the medicines, vitamins, and herbs you take  Include the amounts, and when and why you take them  Bring the list or the pill bottles to follow-up visits  Carry your medicine list with you in case of an emergency  Manage your symptoms:       Rest your painful joint so it can heal   Your healthcare provider may recommend crutches or a walker if the affected joint is in a leg  Apply ice to your joint  Ice decreases pain and swelling  Use an ice pack, or put crushed ice in a plastic bag  Cover the ice pack or bag with a towel before you apply it to your painful joint  Apply ice for 15 to 20 minutes every hour, or as directed  Elevate your joint  Elevation helps reduce swelling and pain  Raise your joint above the level of your heart as often as you can  Prop your painful joint on pillows to keep it above your heart comfortably  Go to physical therapy if directed  A physical therapist can teach you exercises to improve flexibility and range of motion  Help prevent gout attacks:   Do not eat high-purine foods  These foods include meats, seafood, asparagus, spinach, cauliflower, and some types of beans  Healthcare providers may tell you to eat more low-fat milk products, such as yogurt  Milk products may decrease your risk for gout attacks  Vitamin C and coffee may also help  Your healthcare provider or dietitian can help you create a meal plan  Drink liquids as directed  Liquids such as water help remove uric acid from your body   Ask how much liquid to drink each day and which liquids are best for you  Maintain a healthy weight  Weight loss may decrease the amount of uric acid in your body  Ask your healthcare provider what a healthy weight is for you  Ask him or her to help you create a weight loss plan if you are overweight  Control your blood sugar level if you have diabetes  Keep your blood sugar level in a normal range  This can help prevent gout attacks  Limit or do not drink alcohol as directed  Alcohol can trigger a gout attack  Alcohol also increases your risk for dehydration  Ask your healthcare provider if alcohol is safe for you  Follow up with your doctor as directed: You may be referred to a rheumatologist or podiatrist  Write down your questions so you remember to ask them during your visits  © Copyright Rafael Roger 2022 Information is for End User's use only and may not be sold, redistributed or otherwise used for commercial purposes  The above information is an  only  It is not intended as medical advice for individual conditions or treatments  Talk to your doctor, nurse or pharmacist before following any medical regimen to see if it is safe and effective for you

## 2023-05-16 NOTE — CARE ANYWHERE EVISITS
Visit Summary for Antonio Hector - Gender: Male - Date of Birth: [id number]  Date: [id number] - Duration: 7 minutes  Patient: Antonio Hector  Provider: Nati ELKINS    Patient Contact Information  Address  [address] [place], [place] [address]  [phone]    Visit Topics  gout flare up, right foot and knee [Added By: Self - 2023-05-16]    Triage Questions   What is your current physical address in the event of a medical emergency? Answer []  Are you allergic to any medications? Answer []  Are you now or could you be pregnant? Answer []  Do you have any immune system compromise or chronic lung   disease? Answer []  Do you have any vulnerable family members in the home (infant, pregnant, cancer, elderly)? Answer []     Conversation Transcripts  [0A][0A] [Notification] You are connected with Kyle Boles, Urgent Care Specialist [0A][Notification] Skye Wang is located in [place]  [0A][Notification] Skye Wang has shared health history  Coretta Coburn  [0A]    Diagnosis  Gout, unspecified    Procedures  Value: [id number] Code: CPT-4 UNLISTED E&M SERVICE    Medications Prescribed    No prescriptions ordered    Electronically signed by: Kyle Coleman(NPI [id number])

## 2023-05-16 NOTE — PROGRESS NOTES
Video Visit - Pina Powers 56 y o  male MRN: [id number]    REQUIRED DOCUMENTATION:         1  This service was provided via [facility]  2  Provider located at [address] patient [place] [address] [phone]  3  [facility] provider: BRITTNI Jain  4  Identify all parties in room with patient during [facility] visit:  Patient and girlfriend  5  After connecting through [employer], patient was identified by name and date of birth  Patient was then informed that this was a Telemedicine visit and that the exam was being conducted confidentially over secure lines  My office door was closed  No one else was in the room  Patient acknowledged consent and understanding of privacy and security of the Telemedicine visit  I informed the patient that I have reviewed their record in Epic and presented the opportunity for them to ask any questions regarding the visit today  The patient agreed to participate  This is a 56year old male here today for video visit  He state he is having a gout flare  He states he took off work today  He states it is in his right foot and knee  Symptoms started yesterday  He states he was away on vacation and he ate and drank things he was not suppose to  Symptoms are consistent with previous gout flare  Review of Systems   Constitutional: Negative  Respiratory: Negative  Cardiovascular: Negative  Musculoskeletal: Positive for arthralgias  Neurological: Negative  Psychiatric/Behavioral: Negative  There were no vitals filed for this visit  Physical Exam  Constitutional:       General: He is not in acute distress  Appearance: Normal appearance  He is not ill-appearing or toxic-appearing  Musculoskeletal:      Comments: Right foot and ankle: moderate swelling, mild redness    Right knee: mild swelling  Mild redness  Neurological:      Mental Status: He is alert and oriented to person, place, and time  Psychiatric:         Mood and Affect: Mood normal          Behavior: Behavior normal          Thought Content: Thought content normal          Judgment: Judgment normal        Diagnoses and all orders for this visit:    Acute gout of right foot, unspecified cause  -     methylprednisolone (MEDROL) 4 mg tablet; Medrol dose pack Take as directed  Patient Instructions     Will try medrol dose pack  Ice every 3-4 hours  Tylenol as needed  Avoid foods that will trigger gout  Follow up with PCP go to ER with worsening symptoms  Gout   WHAT YOU NEED TO KNOW:   Gout is a form of arthritis that causes severe joint pain, redness, swelling, and stiffness  Acute gout pain starts suddenly, gets worse quickly, and stops on its own  Acute gout can become chronic and cause permanent damage to the joints  DISCHARGE INSTRUCTIONS:   Return to the emergency department if:   • You have severe pain in one or more of your joints that you cannot tolerate  • You have a fever or redness that spreads beyond the joint area  Call your doctor if:   • You have new symptoms, such as a rash, after you start gout treatment  • Your joint pain and swelling do not go away, even after treatment  • You are not urinating as much or as often as you usually do  • You have trouble taking your gout medicines  • You have questions or concerns about your condition or care  Medicines: You may need any of the following:  • Prescription pain medicine  may be given  Ask your healthcare provider how to take this medicine safely  Some prescription pain medicines contain acetaminophen  Do not take other medicines that contain acetaminophen without talking to your healthcare provider  Too much acetaminophen may cause liver damage  Prescription pain medicine may cause constipation  Ask your healthcare provider how to prevent or treat constipation  • NSAIDs , such as ibuprofen, help decrease swelling, pain, and fever   This medicine is available with or without a doctor's order  NSAIDs can cause stomach bleeding or kidney problems in certain people  If you take blood thinner medicine, always ask your healthcare provider if NSAIDs are safe for you  Always read the medicine label and follow directions  • Gout medicine  decreases joint pain and swelling  It may also be given to prevent new gout attacks  • Steroids  reduce inflammation and can help your joint stiffness and pain during gout attacks  • Uric acid medicine  may be given to reduce the amount of uric acid your body makes  Some medicines may help you pass more uric acid when you urinate  • Take your medicine as directed  Contact your healthcare provider if you think your medicine is not helping or if you have side effects  Tell your provider if you are allergic to any medicine  Keep a list of the medicines, vitamins, and herbs you take  Include the amounts, and when and why you take them  Bring the list or the pill bottles to follow-up visits  Carry your medicine list with you in case of an emergency  Manage your symptoms:       • Rest your painful joint so it can heal   Your healthcare provider may recommend crutches or a walker if the affected joint is in a leg  • Apply ice to your joint  Ice decreases pain and swelling  Use an ice pack, or put crushed ice in a plastic bag  Cover the ice pack or bag with a towel before you apply it to your painful joint  Apply ice for 15 to 20 minutes every hour, or as directed  • Elevate your joint  Elevation helps reduce swelling and pain  Raise your joint above the level of your heart as often as you can  Prop your painful joint on pillows to keep it above your heart comfortably  • Go to physical therapy if directed  A physical therapist can teach you exercises to improve flexibility and range of motion  Help prevent gout attacks:   • Do not eat high-purine foods    These foods include meats, seafood, asparagus, spinach, cauliflower, and some types of beans  Healthcare providers may tell you to eat more low-fat milk products, such as yogurt  Milk products may decrease your risk for gout attacks  Vitamin C and coffee may also help  Your healthcare provider or dietitian can help you create a meal plan  • Drink liquids as directed  Liquids such as water help remove uric acid from your body  Ask how much liquid to drink each day and which liquids are best for you  • Maintain a healthy weight  Weight loss may decrease the amount of uric acid in your body  Ask your healthcare provider what a healthy weight is for you  Ask him or her to help you create a weight loss plan if you are overweight  • Control your blood sugar level if you have diabetes  Keep your blood sugar level in a normal range  This can help prevent gout attacks  • Limit or do not drink alcohol as directed  Alcohol can trigger a gout attack  Alcohol also increases your risk for dehydration  Ask your healthcare provider if alcohol is safe for you  Follow up with your doctor as directed: You may be referred to a rheumatologist or podiatrist  Write down your questions so you remember to ask them during your visits  © Copyright Taisha Cabral 2022 Information is for End User's use only and may not be sold, redistributed or otherwise used for commercial purposes  The above information is an  only  It is not intended as medical advice for individual conditions or treatments  Talk to your doctor, nurse or pharmacist before following any medical regimen to see if it is safe and effective for you  Follow up with PCP if not improved, if symptoms are worse, go to the ER

## 2023-05-31 ENCOUNTER — VBI (OUTPATIENT)
Dept: ADMINISTRATIVE | Facility: OTHER | Age: 48
End: 2023-05-31

## 2023-08-05 DIAGNOSIS — M1A.9XX0 CHRONIC GOUT WITHOUT TOPHUS, UNSPECIFIED CAUSE, UNSPECIFIED SITE: ICD-10-CM

## 2023-08-07 RX ORDER — ALLOPURINOL 100 MG/1
TABLET ORAL
Qty: 30 TABLET | Refills: 2 | Status: SHIPPED | OUTPATIENT
Start: 2023-08-07

## 2023-09-06 DIAGNOSIS — E78.00 PURE HYPERCHOLESTEROLEMIA: ICD-10-CM

## 2023-09-06 RX ORDER — ATORVASTATIN CALCIUM 20 MG/1
TABLET, FILM COATED ORAL
Qty: 90 TABLET | Refills: 1 | Status: SHIPPED | OUTPATIENT
Start: 2023-09-06

## 2023-10-04 ENCOUNTER — VBI (OUTPATIENT)
Dept: ADMINISTRATIVE | Facility: OTHER | Age: 48
End: 2023-10-04

## 2023-11-03 DIAGNOSIS — E78.00 PURE HYPERCHOLESTEROLEMIA: ICD-10-CM

## 2023-11-05 RX ORDER — ATORVASTATIN CALCIUM 20 MG/1
20 TABLET, FILM COATED ORAL DAILY
Qty: 90 TABLET | Refills: 0 | Status: SHIPPED | OUTPATIENT
Start: 2023-11-05

## 2023-11-26 DIAGNOSIS — I10 PRIMARY HYPERTENSION: ICD-10-CM

## 2023-11-27 RX ORDER — LISINOPRIL 20 MG/1
20 TABLET ORAL DAILY
Qty: 30 TABLET | Refills: 3 | Status: SHIPPED | OUTPATIENT
Start: 2023-11-27

## 2023-12-27 ENCOUNTER — VBI (OUTPATIENT)
Dept: ADMINISTRATIVE | Facility: OTHER | Age: 48
End: 2023-12-27

## 2024-04-02 DIAGNOSIS — I10 PRIMARY HYPERTENSION: ICD-10-CM

## 2024-04-02 RX ORDER — LISINOPRIL 20 MG/1
20 TABLET ORAL DAILY
Qty: 30 TABLET | Refills: 3 | Status: SHIPPED | OUTPATIENT
Start: 2024-04-02

## 2024-04-13 DIAGNOSIS — E78.00 PURE HYPERCHOLESTEROLEMIA: ICD-10-CM

## 2024-04-15 RX ORDER — ATORVASTATIN CALCIUM 20 MG/1
20 TABLET, FILM COATED ORAL DAILY
Qty: 90 TABLET | Refills: 1 | Status: SHIPPED | OUTPATIENT
Start: 2024-04-15

## 2024-04-19 DIAGNOSIS — M1A.9XX0 CHRONIC GOUT WITHOUT TOPHUS, UNSPECIFIED CAUSE, UNSPECIFIED SITE: ICD-10-CM

## 2024-04-19 RX ORDER — COLCHICINE 0.6 MG/1
0.6 TABLET ORAL DAILY
Qty: 3 TABLET | Refills: 3 | Status: SHIPPED | OUTPATIENT
Start: 2024-04-19 | End: 2024-05-01

## 2024-04-19 RX ORDER — IBUPROFEN 800 MG/1
800 TABLET ORAL EVERY 8 HOURS PRN
Qty: 30 TABLET | Refills: 0 | Status: SHIPPED | OUTPATIENT
Start: 2024-04-19

## 2024-04-19 NOTE — TELEPHONE ENCOUNTER
Spoke to the patient, he is having a severe gout episode. He explained that he cannot move his right hand and he has never had a flare up in his hands before. He is requesting a script for 0.6 mg colchicine be sent to the [facility] in [place].

## 2024-04-21 ENCOUNTER — APPOINTMENT (EMERGENCY)
Dept: RADIOLOGY | Facility: HOSPITAL | Age: 49
End: 2024-04-21
Payer: COMMERCIAL

## 2024-04-21 ENCOUNTER — HOSPITAL ENCOUNTER (EMERGENCY)
Facility: HOSPITAL | Age: 49
Discharge: HOME | End: 2024-04-21
Attending: EMERGENCY MEDICINE
Payer: COMMERCIAL

## 2024-04-21 VITALS
TEMPERATURE: 97.9 F | DIASTOLIC BLOOD PRESSURE: 92 MMHG | OXYGEN SATURATION: 98 % | SYSTOLIC BLOOD PRESSURE: 147 MMHG | HEART RATE: 109 BPM | RESPIRATION RATE: 18 BRPM

## 2024-04-21 DIAGNOSIS — M10.9 GOUT: ICD-10-CM

## 2024-04-21 DIAGNOSIS — M25.531 RIGHT WRIST PAIN: Primary | ICD-10-CM

## 2024-04-21 PROCEDURE — 73110 X-RAY EXAM OF WRIST: CPT

## 2024-04-21 PROCEDURE — 99283 EMERGENCY DEPT VISIT LOW MDM: CPT

## 2024-04-21 PROCEDURE — 99284 EMERGENCY DEPT VISIT MOD MDM: CPT | Performed by: EMERGENCY MEDICINE

## 2024-04-21 RX ORDER — PREDNISONE 10 MG/1
TABLET ORAL DAILY
Qty: 30 TABLET | Refills: 0 | Status: SHIPPED | OUTPATIENT
Start: 2024-04-22 | End: 2024-05-02

## 2024-04-21 RX ADMIN — PREDNISONE 50 MG: 20 TABLET ORAL at 17:33

## 2024-04-21 NOTE — ED PROVIDER NOTES
Pt Name: aLuri Hector  MRN: [id number]  Birthdate 1975  Age/Sex: 48 y.o. male  Date of evaluation: 4/21/2024  PCP: Yousuf Marcos MD    CHIEF COMPLAINT    Chief Complaint   Patient presents with    Hand Pain     Pt is having a gout flare up in the left hand. Pt was seen by his pcp who gave him colchicine which the pt has been taking but hasn't been helping          HPI and MDM    48 y.o. male presenting with right wrist/hand pain and swelling that started on Thursday, He started taking colchicine on Friday. Hx of gout. He is on allopurinol. Has never had gout on hand/wrist before. Normally gets it on big toes, has had it on right knee before. Normally resolves with colchicine, but has not improved yet. Has had to use prednisone in the past for gout as well. No fevers or chills. He is right hand dominant. No injuries. No skin wounds. No purulence.     Primary concern for acute gouty flare.  Less likely septic arthritis.  He is able to minimally range his wrist without significant pain.  No fevers.  No wounds.  No purulence.  No fluctuance.  Has tenderness primarily to the radial aspect of the right wrist.      ED Course as of 04/21/24 2111   Sun Apr 21, 2024   1718 XR wrist 3+ views RIGHT  Per my independent interpretation, no acute fracture or dislocation, no obvious joint effusion.       Discussed wrist aspiration, does not appear to have significant joint effusion on x-ray or clinically, patient in agreement after shared decision making to defer aspiration at this time and to trial steroids. Started on a prednisone taper.  Close return precautions discussed.  Orthopedic hand follow-up.  Return cautions discussed.  Patient verbalized understanding and is agreement with plan.    Medications   predniSONE tablet 50 mg (50 mg Oral Given 4/21/24 5826)         Past Medical and Surgical History    Past Medical History:   Diagnosis Date    Acute gouty arthropathy 10/13/2017       History reviewed. No pertinent  surgical history.    Family History   Problem Relation Age of Onset    Ovarian cancer Mother        Social History     Tobacco Use    Smoking status: Light Smoker     Types: Cigars    Smokeless tobacco: Current    Tobacco comments:     occasional cigar   Substance Use Topics    Alcohol use: Yes     Comment: social    Drug use: No           Allergies    Allergies   Allergen Reactions    Gluten Meal - Food Allergy        Home Medications    Prior to Admission medications    Medication Sig Start Date End Date Taking? Authorizing Provider   allopurinol (ZYLOPRIM) 100 mg tablet TAKE ONE TABLET BY MOUTH EVERY DAY 8/7/23   Yousuf Marcos MD   atorvastatin (LIPITOR) 20 mg tablet Take 1 tablet (20 mg total) by mouth daily 4/15/24   Yousuf Marcos MD   colchicine (COLCRYS) 0.6 mg tablet Take 1 tablet (0.6 mg total) by mouth daily for 12 days 4/19/24 5/1/24  Yousuf Marcos MD   ibuprofen (MOTRIN) 800 mg tablet Take 1 tablet (800 mg total) by mouth every 8 (eight) hours as needed for mild pain 4/19/24   Yousuf Marcos MD   lisinopril (ZESTRIL) 20 mg tablet TAKE ONE TABLET BY MOUTH EVERY DAY 4/2/24   Yousuf Marcos MD   methylprednisolone (MEDROL) 4 mg tablet Medrol dose pack Take as directed. 5/15/23   BRITTNI Lance           Physical Exam      ED Triage Vitals [04/21/24 1640]   Temperature Pulse Respirations Blood Pressure SpO2   97.9 °F (36.6 °C) (!) 109 18 147/92 98 %      Temp Source Heart Rate Source Patient Position - Orthostatic VS BP Location FiO2 (%)   Temporal Monitor -- -- --      Pain Score       --               Physical Exam  Constitutional:       General: He is not in acute distress.  HENT:      Head: Normocephalic.      Nose: Nose normal.      Mouth/Throat:      Mouth: Mucous membranes are moist.   Eyes:      Conjunctiva/sclera: Conjunctivae normal.   Cardiovascular:      Comments: R radial pulse in tact  Pulmonary:      Effort: Pulmonary effort is normal. No respiratory distress.   Musculoskeletal:      Cervical  back: Normal range of motion.      Comments: Right wrist swelling with mild erythema. He is able to make micro movements at the right wrist without pain. No pain in the fingers.    Skin:     General: Skin is warm.   Neurological:      Mental Status: He is alert and oriented to person, place, and time.      Sensory: No sensory deficit.      Motor: No weakness.              Diagnostic Results      Labs:    Results Reviewed       None            All labs reviewed and utilized in the medical decision making process    Radiology:    XR wrist 3+ views RIGHT    (Results Pending)       All radiology studies independently viewed by me and interpreted by the radiologist.    Procedure    Procedures        FINAL IMPRESSION    Final diagnoses:   Right wrist pain   Gout         DISPOSITION    Time reflects when diagnosis was documented in both MDM as applicable and the Disposition within this note       Time User Action Codes Description Comment    4/21/2024  5:27 PM June Zambrano Add [M25.531] Right wrist pain     4/21/2024  5:27 PM June Zambrano Add [M10.9] Gout           ED Disposition       ED Disposition   Discharge    Condition   Stable    Date/Time   Sun Apr 21, 2024  5:27 PM    Comment   Lauri Hector discharge to home/self care.                   Follow-up Information       Follow up With Specialties Details Why Contact Info    Jim Hancock MD Orthopedic Surgery, Hand Surgery Call in 1 day  [address]  [address]  [facility] [address]  [phone]                PATIENT REFERRED TO:    Jim Hancock MD  [address]  [address]  [place] [address]  [phone]    Call in 1 day        DISCHARGE MEDICATIONS:    Discharge Medication List as of 4/21/2024  5:29 PM        START taking these medications    Details   predniSONE 10 mg tablet Multiple Dosages:Starting Mon 4/22/2024, Until Tue 4/23/2024, THEN Starting Wed 4/24/2024, Until Thu 4/25/2024, THEN Starting Fri 4/26/2024, Until Sat 4/27/2024,  THEN Starting Sun 4/28/2024, Until Mon 4/29/2024, THEN Starting Tue 4/30/2024, Until We d 5/1/2024Take 5 tablets (50 mg total) by mouth daily for 2 days, THEN 4 tablets (40 mg total) daily for 2 days, THEN 3 tablets (30 mg total) daily for 2 days, THEN 2 tablets (20 mg total) daily for 2 days, THEN 1 tablet (10 mg total) daily for 2 days.  Do not start before April 22, 2024., Normal           CONTINUE these medications which have NOT CHANGED    Details   allopurinol (ZYLOPRIM) 100 mg tablet TAKE ONE TABLET BY MOUTH EVERY DAY, Normal      atorvastatin (LIPITOR) 20 mg tablet Take 1 tablet (20 mg total) by mouth daily, Starting Mon 4/15/2024, Normal      colchicine (COLCRYS) 0.6 mg tablet Take 1 tablet (0.6 mg total) by mouth daily for 12 days, Starting Fri 4/19/2024, Until Wed 5/1/2024, Normal      ibuprofen (MOTRIN) 800 mg tablet Take 1 tablet (800 mg total) by mouth every 8 (eight) hours as needed for mild pain, Starting Fri 4/19/2024, Normal      lisinopril (ZESTRIL) 20 mg tablet TAKE ONE TABLET BY MOUTH EVERY DAY, Starting Tue 4/2/2024, Normal      methylprednisolone (MEDROL) 4 mg tablet Medrol dose pack Take as directed., Normal             No discharge procedures on file.         June Zambrano DO        This note was partially completed using voice recognition technology, and was scanned for gross errors; however some errors may still exist. Please contact the author with any questions or requests for clarification.      June Zambrano DO  04/21/24 1466

## 2024-04-21 NOTE — Clinical Note
Lauri Hector was seen and treated in our emergency department on 4/21/2024.                Diagnosis:     Lauri  may return to work on return date.    He may return on this date: 04/24/2024         If you have any questions or concerns, please don't hesitate to call.      June Zambrano, DO    ______________________________           _______________          _______________  Hospital Representative                              Date                                Time

## 2024-05-01 ENCOUNTER — VBI (OUTPATIENT)
Dept: ADMINISTRATIVE | Facility: OTHER | Age: 49
End: 2024-05-01

## 2024-05-05 ENCOUNTER — AMB VIDEO VISIT (OUTPATIENT)
Dept: OTHER | Facility: HOSPITAL | Age: 49
End: 2024-05-05

## 2024-05-05 VITALS — HEIGHT: 70 IN | WEIGHT: 249 LBS | TEMPERATURE: 98.4 F | BODY MASS INDEX: 35.65 KG/M2

## 2024-05-05 DIAGNOSIS — M10.431 OTHER SECONDARY ACUTE GOUT OF RIGHT WRIST: Primary | ICD-10-CM

## 2024-05-05 PROCEDURE — ECARE PR SL URGENT CARE VIRTUAL VISIT: Performed by: PHYSICIAN ASSISTANT

## 2024-05-05 RX ORDER — INDOMETHACIN 50 MG/1
50 CAPSULE ORAL 2 TIMES DAILY WITH MEALS
Qty: 4 CAPSULE | Refills: 0 | Status: SHIPPED | OUTPATIENT
Start: 2024-05-05 | End: 2024-05-07

## 2024-05-05 NOTE — CARE ANYWHERE EVISITS
Visit Summary for BABS EDWARDS - Gender: Male - Date of Birth: 1975  Date: [id number] - Duration: 16 minutes  Patient: BABS EDWARDS  Provider: Shannon Severino PA-C    Patient Contact Information  Address  [address]; [place] [address]  [phone]    Visit Topics  pain, gout right wrist can't move  [Added By: Self - 2024-05-05]    Triage Questions   What is your current physical address in the event of a medical emergency? Answer []  Are you allergic to any medications? Answer []  Are you now or could you be pregnant? Answer []  Do you have any immune system compromise or chronic lung   disease? Answer []  Do you have any vulnerable family members in the home (infant, pregnant, cancer, elderly)? Answer []     Conversation Transcripts  [0A][0A] [Notification] You are connected with Shannon Severino PA-C, Urgent Care Specialist.[0A][Notification] BABS EDWARDS is located in [place].[0A][Notification] BABS EDWARDS has shared health history...[0A]    Diagnosis  Other secondary gout, right wrist    Procedures  Value: [id number] Code: CPT-4 UNLISTED E&M SERVICE    Medications Prescribed    No prescriptions ordered    Electronically signed by: Severino PA-C, Shannon(NPI [id number])

## 2024-05-05 NOTE — PROGRESS NOTES
Required Documentation:  Encounter provider: Shannon D Severino, PA-C    Identify all parties in room with patient during virtual visit:  significant other-permission granted and child(olena)- permission granted    The patient was identified by name and date of birth. Lauri Hector was informed that this is a telemedicine visit and that the visit is being conducted through the [employer] platform. He agrees to proceed..  My office door was closed. No one else was in the room.  He acknowledged consent and understanding of privacy and security of the video platform. The patient has agreed to participate and understands they can discontinue the visit at any time.    Verification of patient location:  Patient is located at Home in the following state in which I hold an active license [place]    Patient is aware this is a billable service.     Reason for visit is No chief complaint on file.       Subjective  HPI   Pt reports gout attack to R wrist. Started on colchicine 4/18, took for 3 days. It got worse. Was seen in ER 4/21. Started on prednisone. Took it for 10 days. Reports it was improving but never fully resolved. No fevers. No injury. R hand dominant. Does drink ETOH, 4-6 per week, does eat red meat.     Past Medical History:   Diagnosis Date    Acute gouty arthropathy 10/13/2017       No past surgical history on file.     Allergies   Allergen Reactions    Gluten Meal - Food Allergy        Review of Systems   Constitutional:  Negative for fever.   HENT:  Negative for nosebleeds.    Eyes:  Negative for redness.   Respiratory:  Negative for shortness of breath.    Cardiovascular:  Negative for chest pain.   Gastrointestinal:  Negative for blood in stool.   Genitourinary:  Negative for hematuria.   Musculoskeletal:  Negative for gait problem.   Skin:  Negative for rash.   Neurological:  Negative for seizures.   Psychiatric/Behavioral:  Negative for behavioral problems.        Video Exam    Vitals:     "05/05/24 1923   Temp: 98.4 °F (36.9 °C)   Weight: 113 kg (249 lb)   Height: 5' 10\" (1.778 m)       Physical Exam  Constitutional:       General: He is not in acute distress.     Appearance: Normal appearance. He is not toxic-appearing.   HENT:      Head: Normocephalic and atraumatic.      Nose: No rhinorrhea.      Mouth/Throat:      Mouth: Mucous membranes are moist.   Eyes:      Conjunctiva/sclera: Conjunctivae normal.   Pulmonary:      Effort: Pulmonary effort is normal. No respiratory distress.      Breath sounds: No wheezing (no gross audible wheeze through computer).   Musculoskeletal:      Cervical back: Normal range of motion.   Skin:     Findings: No rash (on face or neck).   Neurological:      Mental Status: He is alert.      Cranial Nerves: No dysarthria or facial asymmetry.   Psychiatric:         Mood and Affect: Mood normal.         Behavior: Behavior normal.         Visit Time  Total Visit Duration: 17 minutes    Assessment/Plan:    Diagnoses and all orders for this visit:    Other secondary acute gout of right wrist  -     Ambulatory Referral to Orthopedic Surgery; Future  -     Discontinue: Diclofenac Sodium (VOLTAREN) 1 %; Apply 2 g topically 4 (four) times a day  -     indomethacin (INDOCIN) 50 mg capsule; Take 1 capsule (50 mg total) by mouth 2 (two) times a day with meals for 2 days        Patient Instructions   Take colchicine 2 tabs once then another pill 1 hour later. Avoid foods high in purine.  Rest your injury as much as possible.  Ice the injury 20 minutes on 20 minutes off as needed for pain.  Compress the area using an ace wrap or compression bandage.  Elevate the injury above the level of the heart as much as possible to help with swelling. Take the indomethacin as needed for severe pain. Stop use if decreased urine output, dark/tea colored urine, muscle cramping, or anything else concerning. You can also take Tylenol in between there for continued pain relief.  Please follow-up with an " orthopedic group if you continue to have pain. Call [phone] to schedule. Go to the emergency department sooner if you have any new, worsening, or concerning symptoms.  Gout   WHAT YOU NEED TO KNOW:   Gout is a form of arthritis that causes severe joint pain, redness, swelling, and stiffness. Acute gout pain starts suddenly, gets worse quickly, and stops on its own. Acute gout can become chronic and cause permanent damage to the joints.  DISCHARGE INSTRUCTIONS:   Return to the emergency department if:   You have severe pain in one or more of your joints that you cannot tolerate.    You have a fever or redness that spreads beyond the joint area.    Call your doctor if:   You have new symptoms, such as a rash, after you start gout treatment.    Your joint pain and swelling do not go away, even after treatment.    You are not urinating as much or as often as you usually do.    You have trouble taking your gout medicines.    You have questions or concerns about your condition or care.    Medicines:  You may need any of the following:  Prescription pain medicine  may be given. Ask your healthcare provider how to take this medicine safely. Some prescription pain medicines contain acetaminophen. Do not take other medicines that contain acetaminophen without talking to your healthcare provider. Too much acetaminophen may cause liver damage. Prescription pain medicine may cause constipation. Ask your healthcare provider how to prevent or treat constipation.     NSAIDs , such as ibuprofen, help decrease swelling, pain, and fever. This medicine is available with or without a doctor's order. NSAIDs can cause stomach bleeding or kidney problems in certain people. If you take blood thinner medicine, always ask your healthcare provider if NSAIDs are safe for you. Always read the medicine label and follow directions.    Gout medicine  decreases joint pain and swelling. It may also be given to prevent new gout  attacks.    Steroids  reduce inflammation and can help your joint stiffness and pain during gout attacks.    Uric acid medicine  may be given to reduce the amount of uric acid your body makes. Some medicines may help you pass more uric acid when you urinate.    Take your medicine as directed.  Contact your healthcare provider if you think your medicine is not helping or if you have side effects. Tell your provider if you are allergic to any medicine. Keep a list of the medicines, vitamins, and herbs you take. Include the amounts, and when and why you take them. Bring the list or the pill bottles to follow-up visits. Carry your medicine list with you in case of an emergency.    Manage your symptoms:       Rest your painful joint so it can heal.  Your healthcare provider may recommend crutches or a walker if the affected joint is in a leg.    Apply ice to your joint.  Ice decreases pain and swelling. Use an ice pack, or put crushed ice in a plastic bag. Cover the ice pack or bag with a towel before you apply it to your painful joint. Apply ice for 15 to 20 minutes every hour, or as directed.    Elevate your joint.  Elevation helps reduce swelling and pain. Raise your joint above the level of your heart as often as you can. Prop your painful joint on pillows to keep it above your heart comfortably.    Go to physical therapy if directed.  A physical therapist can teach you exercises to improve flexibility and range of motion.  Help prevent gout attacks:   Do not eat high-purine foods.  These foods include meats, seafood, asparagus, spinach, cauliflower, and some types of beans. Healthcare providers may tell you to eat more low-fat milk products, such as yogurt. Milk products may decrease your risk for gout attacks. Vitamin C and coffee may also help. Your healthcare provider or dietitian can help you create a meal plan.    Drink liquids as directed.  Liquids such as water help remove uric acid from your body. Ask how  much liquid to drink each day and which liquids are best for you.    Maintain a healthy weight.  Weight loss may decrease the amount of uric acid in your body. Ask your healthcare provider what a healthy weight is for you. Ask him or her to help you create a weight loss plan if you are overweight.    Control your blood sugar level if you have diabetes.  Keep your blood sugar level in a normal range. This can help prevent gout attacks.    Limit or do not drink alcohol as directed.  Alcohol can trigger a gout attack. Alcohol also increases your risk for dehydration. Ask your healthcare provider if alcohol is safe for you.    Follow up with your doctor as directed:  You may be referred to a rheumatologist or podiatrist. Write down your questions so you remember to ask them during your visits.  © Copyright [employer] 2023 Information is for End User's use only and may not be sold, redistributed or otherwise used for commercial purposes.  The above information is an  only. It is not intended as medical advice for individual conditions or treatments. Talk to your doctor, nurse or pharmacist before following any medical regimen to see if it is safe and effective for you.

## 2024-05-05 NOTE — PATIENT INSTRUCTIONS
Take colchicine 2 tabs once then another pill 1 hour later. Avoid foods high in purine.  Rest your injury as much as possible.  Ice the injury 20 minutes on 20 minutes off as needed for pain.  Compress the area using an ace wrap or compression bandage.  Elevate the injury above the level of the heart as much as possible to help with swelling. Take the indomethacin as needed for severe pain. Stop use if decreased urine output, dark/tea colored urine, muscle cramping, or anything else concerning. You can also take Tylenol in between there for continued pain relief.  Please follow-up with an orthopedic group if you continue to have pain. Call [phone] to schedule. Go to the emergency department sooner if you have any new, worsening, or concerning symptoms.  Gout   WHAT YOU NEED TO KNOW:   Gout is a form of arthritis that causes severe joint pain, redness, swelling, and stiffness. Acute gout pain starts suddenly, gets worse quickly, and stops on its own. Acute gout can become chronic and cause permanent damage to the joints.  DISCHARGE INSTRUCTIONS:   Return to the emergency department if:   You have severe pain in one or more of your joints that you cannot tolerate.    You have a fever or redness that spreads beyond the joint area.    Call your doctor if:   You have new symptoms, such as a rash, after you start gout treatment.    Your joint pain and swelling do not go away, even after treatment.    You are not urinating as much or as often as you usually do.    You have trouble taking your gout medicines.    You have questions or concerns about your condition or care.    Medicines:  You may need any of the following:  Prescription pain medicine  may be given. Ask your healthcare provider how to take this medicine safely. Some prescription pain medicines contain acetaminophen. Do not take other medicines that contain acetaminophen without talking to your healthcare provider. Too much acetaminophen may cause liver  damage. Prescription pain medicine may cause constipation. Ask your healthcare provider how to prevent or treat constipation.     NSAIDs , such as ibuprofen, help decrease swelling, pain, and fever. This medicine is available with or without a doctor's order. NSAIDs can cause stomach bleeding or kidney problems in certain people. If you take blood thinner medicine, always ask your healthcare provider if NSAIDs are safe for you. Always read the medicine label and follow directions.    Gout medicine  decreases joint pain and swelling. It may also be given to prevent new gout attacks.    Steroids  reduce inflammation and can help your joint stiffness and pain during gout attacks.    Uric acid medicine  may be given to reduce the amount of uric acid your body makes. Some medicines may help you pass more uric acid when you urinate.    Take your medicine as directed.  Contact your healthcare provider if you think your medicine is not helping or if you have side effects. Tell your provider if you are allergic to any medicine. Keep a list of the medicines, vitamins, and herbs you take. Include the amounts, and when and why you take them. Bring the list or the pill bottles to follow-up visits. Carry your medicine list with you in case of an emergency.    Manage your symptoms:       Rest your painful joint so it can heal.  Your healthcare provider may recommend crutches or a walker if the affected joint is in a leg.    Apply ice to your joint.  Ice decreases pain and swelling. Use an ice pack, or put crushed ice in a plastic bag. Cover the ice pack or bag with a towel before you apply it to your painful joint. Apply ice for 15 to 20 minutes every hour, or as directed.    Elevate your joint.  Elevation helps reduce swelling and pain. Raise your joint above the level of your heart as often as you can. Prop your painful joint on pillows to keep it above your heart comfortably.    Go to physical therapy if directed.  A physical  therapist can teach you exercises to improve flexibility and range of motion.  Help prevent gout attacks:   Do not eat high-purine foods.  These foods include meats, seafood, asparagus, spinach, cauliflower, and some types of beans. Healthcare providers may tell you to eat more low-fat milk products, such as yogurt. Milk products may decrease your risk for gout attacks. Vitamin C and coffee may also help. Your healthcare provider or dietitian can help you create a meal plan.    Drink liquids as directed.  Liquids such as water help remove uric acid from your body. Ask how much liquid to drink each day and which liquids are best for you.    Maintain a healthy weight.  Weight loss may decrease the amount of uric acid in your body. Ask your healthcare provider what a healthy weight is for you. Ask him or her to help you create a weight loss plan if you are overweight.    Control your blood sugar level if you have diabetes.  Keep your blood sugar level in a normal range. This can help prevent gout attacks.    Limit or do not drink alcohol as directed.  Alcohol can trigger a gout attack. Alcohol also increases your risk for dehydration. Ask your healthcare provider if alcohol is safe for you.    Follow up with your doctor as directed:  You may be referred to a rheumatologist or podiatrist. Write down your questions so you remember to ask them during your visits.  © Copyright [employer] 2023 Information is for End User's use only and may not be sold, redistributed or otherwise used for commercial purposes.  The above information is an  only. It is not intended as medical advice for individual conditions or treatments. Talk to your doctor, nurse or pharmacist before following any medical regimen to see if it is safe and effective for you.

## 2024-05-06 ENCOUNTER — TELEPHONE (OUTPATIENT)
Age: 49
End: 2024-05-06

## 2024-05-06 NOTE — TELEPHONE ENCOUNTER
Patient is being referred to a orthopedics. Please schedule accordingly.    [facility]   [phone]

## 2024-05-10 ENCOUNTER — OFFICE VISIT (OUTPATIENT)
Dept: OBGYN CLINIC | Facility: CLINIC | Age: 49
End: 2024-05-10
Payer: COMMERCIAL

## 2024-05-10 VITALS
TEMPERATURE: 99.4 F | HEIGHT: 70 IN | DIASTOLIC BLOOD PRESSURE: 97 MMHG | HEART RATE: 94 BPM | WEIGHT: 253 LBS | BODY MASS INDEX: 36.22 KG/M2 | SYSTOLIC BLOOD PRESSURE: 149 MMHG

## 2024-05-10 DIAGNOSIS — M10.431 OTHER SECONDARY ACUTE GOUT OF RIGHT WRIST: ICD-10-CM

## 2024-05-10 PROCEDURE — 99244 OFF/OP CNSLTJ NEW/EST MOD 40: CPT | Performed by: FAMILY MEDICINE

## 2024-05-10 PROCEDURE — 20605 DRAIN/INJ JOINT/BURSA W/O US: CPT | Performed by: FAMILY MEDICINE

## 2024-05-10 RX ORDER — ROPIVACAINE HYDROCHLORIDE 5 MG/ML
1 INJECTION, SOLUTION EPIDURAL; INFILTRATION; PERINEURAL
Status: COMPLETED | OUTPATIENT
Start: 2024-05-10 | End: 2024-05-10

## 2024-05-10 RX ORDER — TRIAMCINOLONE ACETONIDE 40 MG/ML
20 INJECTION, SUSPENSION INTRA-ARTICULAR; INTRAMUSCULAR
Status: COMPLETED | OUTPATIENT
Start: 2024-05-10 | End: 2024-05-10

## 2024-05-10 RX ADMIN — TRIAMCINOLONE ACETONIDE 20 MG: 40 INJECTION, SUSPENSION INTRA-ARTICULAR; INTRAMUSCULAR at 11:00

## 2024-05-10 RX ADMIN — ROPIVACAINE HYDROCHLORIDE 1 ML: 5 INJECTION, SOLUTION EPIDURAL; INFILTRATION; PERINEURAL at 11:00

## 2024-05-10 NOTE — LETTER
May 13, 2024     Shannon D Severino, PA-C  [address] [facility] [address]    Patient: Lauri Hector   YOB: 1975   Date of Visit: 5/10/2024       Dear Dr. Severino:    Thank you for referring Lauri Hector to me for evaluation. Below are my notes for this consultation.    If you have questions, please do not hesitate to call me. I look forward to following your patient along with you.         Sincerely,        Jas Alan MD        CC: No Recipients    Jas Alan MD  5/10/2024 11:15 AM  Signed  [facility] [address] [id number] [phone]      Assessment:  1. Other secondary acute gout of right wrist  -     Ambulatory Referral to Orthopedic Surgery        Plan:  Patient Instructions   F/u as needed  R wrist joint steroid injection  Continue allopurinol  F/u with PCP  Icing/heat/OTC pain meds as needed.     Return if symptoms worsen or fail to improve.    Chief Complaint:  Chief Complaint   Patient presents with   • Right Wrist - Pain       Subjective:   HPI    Patient ID: Lauri Hector is a 48 y.o. male     Here c/o R wrist pain  Hx of gout- on colchicine/allopurinol  About 4 wks ago started having R wrist pain  Denies injury  Seen by PCP/ER- given steroids/colchicine-still having pain  Swelling has dec  He did have swelling/redness/warmth/pain  Some improvement but still having pain        XR WRIST 3+ VW RIGHT     INDICATION: pain, swelling, hx of gout.     COMPARISON: None     FINDINGS:     No acute fracture or dislocation.     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 No discrete marginal erosions.     No lytic or blastic osseous lesion.     Mild soft tissue swelling about the wrist.     IMPRESSION:     No acute osseous abnormality. Mild radiocarpal joint osteoarthrosis and mild soft tissue swelling.       Review of Systems   Constitutional:  Negative for fatigue and fever.  "  Respiratory:  Negative for shortness of breath.    Cardiovascular:  Negative for chest pain.   Gastrointestinal:  Negative for abdominal pain and nausea.   Genitourinary:  Negative for dysuria.   Musculoskeletal:  Positive for arthralgias.   Skin:  Negative for rash and wound.   Neurological:  Negative for weakness and headaches.       Objective:  Vitals:  /97   Pulse 94   Temp 99.4 °F (37.4 °C)   Ht 5' 10\" (1.778 m)   Wt 115 kg (253 lb)   BMI 36.30 kg/m²     The following portions of the patient's history were reviewed and updated as appropriate: allergies, current medications, past family history, past medical history, past social history, past surgical history, and problem list.    Physical exam:  Physical Exam  Constitutional:       Appearance: Normal appearance. He is normal weight.   Eyes:      Extraocular Movements: Extraocular movements intact.   Pulmonary:      Effort: Pulmonary effort is normal.   Musculoskeletal:         General: Tenderness present.      Cervical back: Normal range of motion.      Comments: R wrist- pain with flex/ext/swelling/TTP carpal joints.  NVI   Skin:     General: Skin is warm and dry.   Neurological:      General: No focal deficit present.      Mental Status: He is alert and oriented to person, place, and time. Mental status is at baseline.   Psychiatric:         Mood and Affect: Mood normal.         Behavior: Behavior normal.         Thought Content: Thought content normal.         Judgment: Judgment normal.       Ortho Exam    I have personally reviewed pertinent films in PACS and my interpretation is XR  R wrist- no fx.  Medium joint arthrocentesis: R radiocarpal  Universal Protocol:  Consent: Verbal consent obtained.  Risks and benefits: risks, benefits and alternatives were discussed  Consent given by: patient  Timeout called at: 5/10/2024 11:12 AM.  Supporting Documentation  Indications: pain and joint swelling   Procedure Details  Location: wrist - R " radiocarpal  Preparation: Patient was prepped and draped in the usual sterile fashion  Needle size: 25 G  Ultrasound guidance: no  Medications administered: 1 mL ropivacaine 0.5 %; 20 mg triamcinolone acetonide 40 mg/mL    Patient tolerance: patient tolerated the procedure well with no immediate complications  Dressing:  Sterile dressing applied            Jas Alan MD

## 2024-05-10 NOTE — PATIENT INSTRUCTIONS
F/u as needed  R wrist joint steroid injection  Continue allopurinol  F/u with PCP  Icing/heat/OTC pain meds as needed.

## 2024-05-10 NOTE — LETTER
May 10, 2024     Yousuf Marocs MD  [address]  [facility] [address]    Patient: Lauri Hector   YOB: 1975   Date of Visit: 5/10/2024       Dear Dr. Marcos:    Thank you for referring Lauri Hector to me for evaluation. Below are my notes for this consultation.    If you have questions, please do not hesitate to call me. I look forward to following your patient along with you.         Sincerely,        Jas Alan MD        CC: No Recipients    Jas Alan MD  5/10/2024 11:13 AM  Incomplete  [facility] [address] [id number] [phone]      Assessment:  1. Other secondary acute gout of right wrist  -     Ambulatory Referral to Orthopedic Surgery        Plan:  There are no Patient Instructions on file for this visit.   No follow-ups on file.    Chief Complaint:  Chief Complaint   Patient presents with   • Right Wrist - Pain       Subjective:   HPI    Patient ID: Lauri Hector is a 48 y.o. male     Here c/o R wrist pain  Hx of gout- on colchicine/allopurinol  About 4 wks ago started having R wrist pain  Denies injury  Seen by PCP/ER- given steroids/colchicine-still having pain  Swelling has dec  He did have swelling/redness/warmth/pain  Some improvement but still having pain        XR WRIST 3+ VW RIGHT     INDICATION: pain, swelling, hx of gout.     COMPARISON: None     FINDINGS:     No acute fracture or dislocation.     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 No discrete marginal erosions.     No lytic or blastic osseous lesion.     Mild soft tissue swelling about the wrist.     IMPRESSION:     No acute osseous abnormality. Mild radiocarpal joint osteoarthrosis and mild soft tissue swelling.       Review of Systems   Constitutional:  Negative for fatigue and fever.   Respiratory:  Negative for shortness of breath.    Cardiovascular:  Negative for chest pain.   Gastrointestinal:   "Negative for abdominal pain and nausea.   Genitourinary:  Negative for dysuria.   Musculoskeletal:  Positive for arthralgias.   Skin:  Negative for rash and wound.   Neurological:  Negative for weakness and headaches.       Objective:  Vitals:  /97   Pulse 94   Temp 99.4 °F (37.4 °C)   Ht 5' 10\" (1.778 m)   Wt 115 kg (253 lb)   BMI 36.30 kg/m²     The following portions of the patient's history were reviewed and updated as appropriate: allergies, current medications, past family history, past medical history, past social history, past surgical history, and problem list.    Physical exam:  Physical Exam  Constitutional:       Appearance: Normal appearance. He is normal weight.   Eyes:      Extraocular Movements: Extraocular movements intact.   Pulmonary:      Effort: Pulmonary effort is normal.   Musculoskeletal:         General: Tenderness present.      Cervical back: Normal range of motion.      Comments: R wrist- pain with flex/ext/swelling/TTP carpal joints.  NVI   Skin:     General: Skin is warm and dry.   Neurological:      General: No focal deficit present.      Mental Status: He is alert and oriented to person, place, and time. Mental status is at baseline.   Psychiatric:         Mood and Affect: Mood normal.         Behavior: Behavior normal.         Thought Content: Thought content normal.         Judgment: Judgment normal.       Ortho Exam    I have personally reviewed pertinent films in PACS and my interpretation is XR  R wrist- no fx.  Medium joint arthrocentesis: R radiocarpal  Universal Protocol:  Consent: Verbal consent obtained.  Risks and benefits: risks, benefits and alternatives were discussed  Consent given by: patient  Timeout called at: 5/10/2024 11:12 AM.  Supporting Documentation  Indications: pain and joint swelling   Procedure Details  Location: wrist - R radiocarpal  Preparation: Patient was prepped and draped in the usual sterile fashion  Needle size: 25 G  Ultrasound guidance: " no  Medications administered: 1 mL ropivacaine 0.5 %; 20 mg triamcinolone acetonide 40 mg/mL    Patient tolerance: patient tolerated the procedure well with no immediate complications  Dressing:  Sterile dressing applied            Jas Alan MD

## 2024-05-10 NOTE — PROGRESS NOTES
"[facility] [address] [address] [phone]      Assessment:  1. Other secondary acute gout of right wrist  -     Ambulatory Referral to Orthopedic Surgery        Plan:  Patient Instructions   F/u as needed  R wrist joint steroid injection  Continue allopurinol  F/u with PCP  Icing/heat/OTC pain meds as needed.     Return if symptoms worsen or fail to improve.    Chief Complaint:  Chief Complaint   Patient presents with    Right Wrist - Pain       Subjective:   HPI    Patient ID: Lauri Hector is a 48 y.o. male     Here c/o R wrist pain  Hx of gout- on colchicine/allopurinol  About 4 wks ago started having R wrist pain  Denies injury  Seen by PCP/ER- given steroids/colchicine-still having pain  Swelling has dec  He did have swelling/redness/warmth/pain  Some improvement but still having pain        XR WRIST 3+ VW RIGHT     INDICATION: pain, swelling, hx of gout.     COMPARISON: None     FINDINGS:     No acute fracture or dislocation.     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 No discrete marginal erosions.     No lytic or blastic osseous lesion.     Mild soft tissue swelling about the wrist.     IMPRESSION:     No acute osseous abnormality. Mild radiocarpal joint osteoarthrosis and mild soft tissue swelling.       Review of Systems   Constitutional:  Negative for fatigue and fever.   Respiratory:  Negative for shortness of breath.    Cardiovascular:  Negative for chest pain.   Gastrointestinal:  Negative for abdominal pain and nausea.   Genitourinary:  Negative for dysuria.   Musculoskeletal:  Positive for arthralgias.   Skin:  Negative for rash and wound.   Neurological:  Negative for weakness and headaches.       Objective:  Vitals:  /97   Pulse 94   Temp 99.4 °F (37.4 °C)   Ht 5' 10\" (1.778 m)   Wt 115 kg (253 lb)   BMI 36.30 kg/m²     The following portions of the patient's history were reviewed and " updated as appropriate: allergies, current medications, past family history, past medical history, past social history, past surgical history, and problem list.    Physical exam:  Physical Exam  Constitutional:       Appearance: Normal appearance. He is normal weight.   Eyes:      Extraocular Movements: Extraocular movements intact.   Pulmonary:      Effort: Pulmonary effort is normal.   Musculoskeletal:         General: Tenderness present.      Cervical back: Normal range of motion.      Comments: R wrist- pain with flex/ext/swelling/TTP carpal joints.  NVI   Skin:     General: Skin is warm and dry.   Neurological:      General: No focal deficit present.      Mental Status: He is alert and oriented to person, place, and time. Mental status is at baseline.   Psychiatric:         Mood and Affect: Mood normal.         Behavior: Behavior normal.         Thought Content: Thought content normal.         Judgment: Judgment normal.       Ortho Exam    I have personally reviewed pertinent films in PACS and my interpretation is XR  R wrist- no fx.  Medium joint arthrocentesis: R radiocarpal  Universal Protocol:  Consent: Verbal consent obtained.  Risks and benefits: risks, benefits and alternatives were discussed  Consent given by: patient  Timeout called at: 5/10/2024 11:12 AM.  Supporting Documentation  Indications: pain and joint swelling   Procedure Details  Location: wrist - R radiocarpal  Preparation: Patient was prepped and draped in the usual sterile fashion  Needle size: 25 G  Ultrasound guidance: no  Medications administered: 1 mL ropivacaine 0.5 %; 20 mg triamcinolone acetonide 40 mg/mL    Patient tolerance: patient tolerated the procedure well with no immediate complications  Dressing:  Sterile dressing applied            Jas Alan MD

## 2024-10-22 ENCOUNTER — OFFICE VISIT (OUTPATIENT)
Dept: FAMILY MEDICINE CLINIC | Facility: CLINIC | Age: 49
End: 2024-10-22
Payer: COMMERCIAL

## 2024-10-22 ENCOUNTER — HOSPITAL ENCOUNTER (OUTPATIENT)
Dept: ULTRASOUND IMAGING | Facility: HOSPITAL | Age: 49
Discharge: HOME | End: 2024-10-22
Payer: COMMERCIAL

## 2024-10-22 ENCOUNTER — APPOINTMENT (OUTPATIENT)
Dept: LAB | Facility: CLINIC | Age: 49
End: 2024-10-22
Payer: COMMERCIAL

## 2024-10-22 VITALS
OXYGEN SATURATION: 98 % | HEIGHT: 70 IN | DIASTOLIC BLOOD PRESSURE: 94 MMHG | SYSTOLIC BLOOD PRESSURE: 138 MMHG | BODY MASS INDEX: 36.94 KG/M2 | WEIGHT: 258 LBS | TEMPERATURE: 98 F | HEART RATE: 81 BPM

## 2024-10-22 DIAGNOSIS — R39.9 LOWER URINARY TRACT SYMPTOMS (LUTS): Primary | ICD-10-CM

## 2024-10-22 DIAGNOSIS — N50.812 LEFT TESTICULAR PAIN: ICD-10-CM

## 2024-10-22 DIAGNOSIS — E78.2 MIXED HYPERLIPIDEMIA: ICD-10-CM

## 2024-10-22 DIAGNOSIS — M1A.9XX0 CHRONIC GOUT WITHOUT TOPHUS, UNSPECIFIED CAUSE, UNSPECIFIED SITE: ICD-10-CM

## 2024-10-22 DIAGNOSIS — R39.9 LOWER URINARY TRACT SYMPTOMS (LUTS): ICD-10-CM

## 2024-10-22 DIAGNOSIS — R68.82 LOW LIBIDO: ICD-10-CM

## 2024-10-22 DIAGNOSIS — I10 PRIMARY HYPERTENSION: ICD-10-CM

## 2024-10-22 DIAGNOSIS — Z00.00 HEALTH MAINTENANCE EXAMINATION: ICD-10-CM

## 2024-10-22 DIAGNOSIS — Z12.11 SCREEN FOR COLON CANCER: ICD-10-CM

## 2024-10-22 LAB
ALBUMIN SERPL BCG-MCNC: 4.5 G/DL (ref 3.5–5)
ALP SERPL-CCNC: 87 U/L (ref 34–104)
ALT SERPL W P-5'-P-CCNC: 38 U/L (ref 7–52)
ANION GAP SERPL CALCULATED.3IONS-SCNC: 9 MMOL/L (ref 4–13)
AST SERPL W P-5'-P-CCNC: 29 U/L (ref 13–39)
BASOPHILS # BLD AUTO: 0.04 THOUSANDS/ΜL (ref 0–0.1)
BASOPHILS NFR BLD AUTO: 1 % (ref 0–1)
BILIRUB SERPL-MCNC: 0.49 MG/DL (ref 0.2–1)
BILIRUB UR QL STRIP: NEGATIVE
BUN SERPL-MCNC: 15 MG/DL (ref 5–25)
CALCIUM SERPL-MCNC: 10 MG/DL (ref 8.4–10.2)
CHLORIDE SERPL-SCNC: 104 MMOL/L (ref 96–108)
CHOLEST SERPL-MCNC: 196 MG/DL
CLARITY UR: CLEAR
CO2 SERPL-SCNC: 30 MMOL/L (ref 21–32)
COLOR UR: NORMAL
CREAT SERPL-MCNC: 1.08 MG/DL (ref 0.6–1.3)
EOSINOPHIL # BLD AUTO: 0.05 THOUSAND/ΜL (ref 0–0.61)
EOSINOPHIL NFR BLD AUTO: 1 % (ref 0–6)
ERYTHROCYTE [DISTWIDTH] IN BLOOD BY AUTOMATED COUNT: 13.2 % (ref 11.6–15.1)
GFR SERPL CREATININE-BSD FRML MDRD: 80 ML/MIN/1.73SQ M
GLUCOSE P FAST SERPL-MCNC: 104 MG/DL (ref 65–99)
GLUCOSE UR STRIP-MCNC: NEGATIVE MG/DL
HCT VFR BLD AUTO: 45.6 % (ref 36.5–49.3)
HDLC SERPL-MCNC: 47 MG/DL
HGB BLD-MCNC: 14.7 G/DL (ref 12–17)
HGB UR QL STRIP.AUTO: NEGATIVE
IMM GRANULOCYTES # BLD AUTO: 0.02 THOUSAND/UL (ref 0–0.2)
IMM GRANULOCYTES NFR BLD AUTO: 0 % (ref 0–2)
KETONES UR STRIP-MCNC: NEGATIVE MG/DL
LDLC SERPL CALC-MCNC: 95 MG/DL (ref 0–100)
LEUKOCYTE ESTERASE UR QL STRIP: NEGATIVE
LYMPHOCYTES # BLD AUTO: 1.48 THOUSANDS/ΜL (ref 0.6–4.47)
LYMPHOCYTES NFR BLD AUTO: 27 % (ref 14–44)
MCH RBC QN AUTO: 30 PG (ref 26.8–34.3)
MCHC RBC AUTO-ENTMCNC: 32.2 G/DL (ref 31.4–37.4)
MCV RBC AUTO: 93 FL (ref 82–98)
MONOCYTES # BLD AUTO: 0.44 THOUSAND/ΜL (ref 0.17–1.22)
MONOCYTES NFR BLD AUTO: 8 % (ref 4–12)
NEUTROPHILS # BLD AUTO: 3.42 THOUSANDS/ΜL (ref 1.85–7.62)
NEUTS SEG NFR BLD AUTO: 63 % (ref 43–75)
NITRITE UR QL STRIP: NEGATIVE
NRBC BLD AUTO-RTO: 0 /100 WBCS
PH UR STRIP.AUTO: 6 [PH]
PLATELET # BLD AUTO: 282 THOUSANDS/UL (ref 149–390)
PMV BLD AUTO: 10.5 FL (ref 8.9–12.7)
POTASSIUM SERPL-SCNC: 4.6 MMOL/L (ref 3.5–5.3)
PROT SERPL-MCNC: 7.6 G/DL (ref 6.4–8.4)
PROT UR STRIP-MCNC: NEGATIVE MG/DL
PSA SERPL-MCNC: 0.24 NG/ML (ref 0–4)
RBC # BLD AUTO: 4.9 MILLION/UL (ref 3.88–5.62)
SODIUM SERPL-SCNC: 143 MMOL/L (ref 135–147)
SP GR UR STRIP.AUTO: 1.02 (ref 1–1.03)
TRIGL SERPL-MCNC: 270 MG/DL
URATE SERPL-MCNC: 8.9 MG/DL (ref 3.5–8.5)
UROBILINOGEN UR STRIP-ACNC: <2 MG/DL
WBC # BLD AUTO: 5.45 THOUSAND/UL (ref 4.31–10.16)

## 2024-10-22 PROCEDURE — 85025 COMPLETE CBC W/AUTO DIFF WBC: CPT

## 2024-10-22 PROCEDURE — 76870 US EXAM SCROTUM: CPT

## 2024-10-22 PROCEDURE — 84550 ASSAY OF BLOOD/URIC ACID: CPT

## 2024-10-22 PROCEDURE — 80061 LIPID PANEL: CPT

## 2024-10-22 PROCEDURE — 84403 ASSAY OF TOTAL TESTOSTERONE: CPT

## 2024-10-22 PROCEDURE — 84402 ASSAY OF FREE TESTOSTERONE: CPT

## 2024-10-22 PROCEDURE — 99214 OFFICE O/P EST MOD 30 MIN: CPT | Performed by: PHYSICIAN ASSISTANT

## 2024-10-22 PROCEDURE — G0103 PSA SCREENING: HCPCS

## 2024-10-22 PROCEDURE — 80053 COMPREHEN METABOLIC PANEL: CPT

## 2024-10-22 PROCEDURE — 81003 URINALYSIS AUTO W/O SCOPE: CPT

## 2024-10-22 PROCEDURE — 99396 PREV VISIT EST AGE 40-64: CPT | Performed by: PHYSICIAN ASSISTANT

## 2024-10-22 PROCEDURE — 36415 COLL VENOUS BLD VENIPUNCTURE: CPT

## 2024-10-22 RX ORDER — TAMSULOSIN HYDROCHLORIDE 0.4 MG/1
0.4 CAPSULE ORAL
Qty: 30 CAPSULE | Refills: 0 | Status: SHIPPED | OUTPATIENT
Start: 2024-10-22

## 2024-10-22 NOTE — ASSESSMENT & PLAN NOTE
Monitor at home for goal <140/90. Borderline today. Continue lisinopril  Orders:    Comprehensive metabolic panel; Future     negative

## 2024-10-22 NOTE — PROGRESS NOTES
Ambulatory Visit  Name: Lauri Hector      : 1975      MRN: [id number]  Encounter Provider: Sebastian Enriquez PA-C  Encounter Date: 10/22/2024   Encounter department: [facility]    Assessment & Plan  Lower urinary tract symptoms (LUTS)  Check PSA, trial flomax  Orders:    tamsulosin (FLOMAX) 0.4 mg; Take 1 capsule (0.4 mg total) by mouth daily with dinner    PSA, Total Screen; Future    Left testicular pain  Unremarkable exam, seek US, labs, UA  Orders:    US scrotum and testicles; Future    UA w Reflex to Microscopic w Reflex to Culture; Future    CBC and differential; Future    Low libido  Check testosterone  Orders:    Testosterone, free, total; Future    Chronic gout without tophus, unspecified cause, unspecified site  No sx, monitor uric acid  Orders:    Uric acid; Future    Primary hypertension  Monitor at home for goal <140/90. Borderline today. Continue lisinopril  Orders:    Comprehensive metabolic panel; Future    Mixed hyperlipidemia  Continue statin, check lipids  Orders:    Lipid Panel with Direct LDL reflex; Future    Screen for colon cancer    Orders:    Cologuard    Health maintenance examination  Hx reviewed and updated. Update HM, screening labs.             History of Present Illness     Pt presents for physical, follow up  HTN: on lisinopril, a bit high today, 138/92  HLD: on statin, due for FLP  Notes weak urinary stream, nocturia  Also notes trouble getting an erection, low sex drive  Also notes L testicular pain, hx of trauma as a kid. No swelling but shares he feels lobulations in the testicle    No other health changes  Chews tobacco, non smoker  No abuse/misuse of alcohol or illicit drugs  Meds reviewed  Allergies reviewed  No FH changes       Review of Systems   Constitutional:  Negative for chills, fatigue and fever.   HENT:  Negative for congestion, ear pain, hearing loss, nosebleeds, postnasal drip, rhinorrhea, sinus pressure, sinus pain, sneezing and  sore throat.    Eyes:  Negative for pain, discharge, itching and visual disturbance.   Respiratory:  Negative for cough, chest tightness, shortness of breath and wheezing.    Cardiovascular:  Negative for chest pain, palpitations and leg swelling.   Gastrointestinal:  Negative for abdominal pain, blood in stool, constipation, diarrhea, nausea and vomiting.   Genitourinary:  Positive for difficulty urinating and testicular pain. Negative for frequency and urgency.   Neurological:  Negative for dizziness, light-headedness and numbness.     Past Medical History:   Diagnosis Date    Acute gouty arthropathy 10/13/2017     No past surgical history on file.  Family History   Problem Relation Age of Onset    Ovarian cancer Mother      Social History     Tobacco Use    Smoking status: Light Smoker     Types: Cigars    Smokeless tobacco: Current    Tobacco comments:     occasional cigar   Vaping Use    Vaping status: Not on file   Substance and Sexual Activity    Alcohol use: Yes     Comment: social    Drug use: No    Sexual activity: Not on file     Current Outpatient Medications on File Prior to Visit   Medication Sig    atorvastatin (LIPITOR) 20 mg tablet Take 1 tablet (20 mg total) by mouth daily    colchicine (COLCRYS) 0.6 mg tablet Take 1 tablet (0.6 mg total) by mouth daily for 12 days    ibuprofen (MOTRIN) 800 mg tablet Take 1 tablet (800 mg total) by mouth every 8 (eight) hours as needed for mild pain    indomethacin (INDOCIN) 50 mg capsule Take 1 capsule (50 mg total) by mouth 2 (two) times a day with meals for 2 days    lisinopril (ZESTRIL) 20 mg tablet TAKE ONE TABLET BY MOUTH EVERY DAY     No Known Allergies  Immunization History   Administered Date(s) Administered    COVID-19 PFIZER VACCINE 0.3 ML IM 10/06/2021, 10/27/2021    Influenza Quadrivalent Preservative Free 3 years and older IM 10/28/2014    Influenza, seasonal, injectable 10/10/2011, 10/26/2012    Tdap 10/21/2022     Objective     /94   Pulse 81  "  Temp 98 °F (36.7 °C)   Ht 5' 10\" (1.778 m)   Wt 117 kg (258 lb)   SpO2 98%   BMI 37.02 kg/m²     Physical Exam  Vitals and nursing note reviewed.   Constitutional:       General: He is not in acute distress.     Appearance: He is well-developed.   HENT:      Head: Normocephalic and atraumatic.   Eyes:      Conjunctiva/sclera: Conjunctivae normal.   Cardiovascular:      Rate and Rhythm: Normal rate and regular rhythm.      Heart sounds: No murmur heard.  Pulmonary:      Effort: Pulmonary effort is normal. No respiratory distress.      Breath sounds: Normal breath sounds. No wheezing, rhonchi or rales.   Abdominal:      Palpations: Abdomen is soft.      Tenderness: There is no abdominal tenderness.   Genitourinary:     Testes: Normal.      Epididymis:      Right: Normal.      Left: Normal.   Musculoskeletal:         General: No swelling.      Cervical back: Neck supple.   Skin:     General: Skin is warm and dry.      Capillary Refill: Capillary refill takes less than 2 seconds.   Neurological:      Mental Status: He is alert.   Psychiatric:         Mood and Affect: Mood normal.         "

## 2024-10-23 LAB
TESTOST FREE SERPL-MCNC: 5.6 PG/ML (ref 6.8–21.5)
TESTOST SERPL-MCNC: 244 NG/DL (ref 264–916)

## 2024-10-25 ENCOUNTER — TELEPHONE (OUTPATIENT)
Dept: FAMILY MEDICINE CLINIC | Facility: CLINIC | Age: 49
End: 2024-10-25

## 2024-10-25 NOTE — TELEPHONE ENCOUNTER
----- Message from Sebastian Enriquez PA-C sent at 10/24/2024  6:49 AM EDT -----  The testosterone is low, given sx I referred to urology. Please have him follow up  
Left message on machine for the patient to call back and receive results.   
WDL

## 2024-11-03 DIAGNOSIS — I10 PRIMARY HYPERTENSION: ICD-10-CM

## 2024-11-04 RX ORDER — LISINOPRIL 20 MG/1
20 TABLET ORAL DAILY
Qty: 90 TABLET | Refills: 1 | Status: SHIPPED | OUTPATIENT
Start: 2024-11-04

## 2024-11-14 ENCOUNTER — RESULTS FOLLOW-UP (OUTPATIENT)
Dept: FAMILY MEDICINE CLINIC | Facility: CLINIC | Age: 49
End: 2024-11-14

## 2024-11-14 LAB — COLOGUARD RESULT REPORTABLE: NEGATIVE

## 2024-12-04 DIAGNOSIS — E78.00 PURE HYPERCHOLESTEROLEMIA: ICD-10-CM

## 2024-12-05 RX ORDER — ATORVASTATIN CALCIUM 20 MG/1
20 TABLET, FILM COATED ORAL DAILY
Qty: 90 TABLET | Refills: 1 | Status: SHIPPED | OUTPATIENT
Start: 2024-12-05

## 2025-05-17 ENCOUNTER — APPOINTMENT (EMERGENCY)
Dept: RADIOLOGY | Facility: HOSPITAL | Age: 50
End: 2025-05-17
Payer: COMMERCIAL

## 2025-05-17 ENCOUNTER — HOSPITAL ENCOUNTER (EMERGENCY)
Facility: HOSPITAL | Age: 50
Discharge: HOME | End: 2025-05-17
Attending: EMERGENCY MEDICINE | Admitting: EMERGENCY MEDICINE
Payer: COMMERCIAL

## 2025-05-17 VITALS
OXYGEN SATURATION: 100 % | DIASTOLIC BLOOD PRESSURE: 89 MMHG | TEMPERATURE: 98.2 F | SYSTOLIC BLOOD PRESSURE: 150 MMHG | HEART RATE: 95 BPM | RESPIRATION RATE: 20 BRPM

## 2025-05-17 DIAGNOSIS — M25.571 PAIN AND SWELLING OF RIGHT ANKLE: Primary | ICD-10-CM

## 2025-05-17 DIAGNOSIS — M25.471 PAIN AND SWELLING OF RIGHT ANKLE: Primary | ICD-10-CM

## 2025-05-17 DIAGNOSIS — M10.9 GOUT FLARE: ICD-10-CM

## 2025-05-17 DIAGNOSIS — M79.89 SWELLING OF RIGHT FOOT: ICD-10-CM

## 2025-05-17 DIAGNOSIS — M79.671 RIGHT FOOT PAIN: ICD-10-CM

## 2025-05-17 LAB
ALBUMIN SERPL BCG-MCNC: 4 G/DL (ref 3.5–5)
ALP SERPL-CCNC: 108 U/L (ref 34–104)
ALT SERPL W P-5'-P-CCNC: 34 U/L (ref 7–52)
ANION GAP SERPL CALCULATED.3IONS-SCNC: 7 MMOL/L (ref 4–13)
AST SERPL W P-5'-P-CCNC: 21 U/L (ref 13–39)
BASOPHILS # BLD AUTO: 0.04 THOUSANDS/ÂΜL (ref 0–0.1)
BASOPHILS NFR BLD AUTO: 0 % (ref 0–1)
BILIRUB DIRECT SERPL-MCNC: 0.15 MG/DL (ref 0–0.2)
BILIRUB SERPL-MCNC: 0.58 MG/DL (ref 0.2–1)
BUN SERPL-MCNC: 10 MG/DL (ref 5–25)
CALCIUM SERPL-MCNC: 9.2 MG/DL (ref 8.4–10.2)
CHLORIDE SERPL-SCNC: 100 MMOL/L (ref 96–108)
CO2 SERPL-SCNC: 30 MMOL/L (ref 21–32)
CREAT SERPL-MCNC: 0.96 MG/DL (ref 0.6–1.3)
CRP SERPL QL: 47.1 MG/L
EOSINOPHIL # BLD AUTO: 0.03 THOUSAND/ÂΜL (ref 0–0.61)
EOSINOPHIL NFR BLD AUTO: 0 % (ref 0–6)
ERYTHROCYTE [DISTWIDTH] IN BLOOD BY AUTOMATED COUNT: 13.9 % (ref 11.6–15.1)
ERYTHROCYTE [SEDIMENTATION RATE] IN BLOOD: 25 MM/HOUR (ref 0–14)
GFR SERPL CREATININE-BSD FRML MDRD: 93 ML/MIN/1.73SQ M
GLUCOSE SERPL-MCNC: 107 MG/DL (ref 65–140)
HCT VFR BLD AUTO: 46.1 % (ref 36.5–49.3)
HGB BLD-MCNC: 15.1 G/DL (ref 12–17)
IMM GRANULOCYTES # BLD AUTO: 0.04 THOUSAND/UL (ref 0–0.2)
IMM GRANULOCYTES NFR BLD AUTO: 0 % (ref 0–2)
LACTATE SERPL-SCNC: 2 MMOL/L (ref 0.5–2)
LYMPHOCYTES # BLD AUTO: 1.25 THOUSANDS/ÂΜL (ref 0.6–4.47)
LYMPHOCYTES NFR BLD AUTO: 14 % (ref 14–44)
MCH RBC QN AUTO: 29.6 PG (ref 26.8–34.3)
MCHC RBC AUTO-ENTMCNC: 32.8 G/DL (ref 31.4–37.4)
MCV RBC AUTO: 90 FL (ref 82–98)
MONOCYTES # BLD AUTO: 0.69 THOUSAND/ÂΜL (ref 0.17–1.22)
MONOCYTES NFR BLD AUTO: 8 % (ref 4–12)
NEUTROPHILS # BLD AUTO: 7.14 THOUSANDS/ÂΜL (ref 1.85–7.62)
NEUTS SEG NFR BLD AUTO: 78 % (ref 43–75)
NRBC BLD AUTO-RTO: 0 /100 WBCS
PLATELET # BLD AUTO: 293 THOUSANDS/UL (ref 149–390)
PMV BLD AUTO: 8.8 FL (ref 8.9–12.7)
POTASSIUM SERPL-SCNC: 4.2 MMOL/L (ref 3.5–5.3)
PROCALCITONIN SERPL-MCNC: <0.05 NG/ML
PROT SERPL-MCNC: 7.7 G/DL (ref 6.4–8.4)
RBC # BLD AUTO: 5.1 MILLION/UL (ref 3.88–5.62)
SODIUM SERPL-SCNC: 137 MMOL/L (ref 135–147)
URATE SERPL-MCNC: 7.2 MG/DL (ref 3.5–8.5)
WBC # BLD AUTO: 9.19 THOUSAND/UL (ref 4.31–10.16)

## 2025-05-17 PROCEDURE — 73630 X-RAY EXAM OF FOOT: CPT

## 2025-05-17 PROCEDURE — 86140 C-REACTIVE PROTEIN: CPT | Performed by: EMERGENCY MEDICINE

## 2025-05-17 PROCEDURE — 83605 ASSAY OF LACTIC ACID: CPT | Performed by: EMERGENCY MEDICINE

## 2025-05-17 PROCEDURE — 80048 BASIC METABOLIC PNL TOTAL CA: CPT | Performed by: EMERGENCY MEDICINE

## 2025-05-17 PROCEDURE — 84145 PROCALCITONIN (PCT): CPT | Performed by: EMERGENCY MEDICINE

## 2025-05-17 PROCEDURE — 36415 COLL VENOUS BLD VENIPUNCTURE: CPT | Performed by: EMERGENCY MEDICINE

## 2025-05-17 PROCEDURE — 96374 THER/PROPH/DIAG INJ IV PUSH: CPT

## 2025-05-17 PROCEDURE — 80076 HEPATIC FUNCTION PANEL: CPT | Performed by: EMERGENCY MEDICINE

## 2025-05-17 PROCEDURE — 99283 EMERGENCY DEPT VISIT LOW MDM: CPT

## 2025-05-17 PROCEDURE — 84550 ASSAY OF BLOOD/URIC ACID: CPT | Performed by: EMERGENCY MEDICINE

## 2025-05-17 PROCEDURE — 96361 HYDRATE IV INFUSION ADD-ON: CPT

## 2025-05-17 PROCEDURE — 73610 X-RAY EXAM OF ANKLE: CPT

## 2025-05-17 PROCEDURE — 85025 COMPLETE CBC W/AUTO DIFF WBC: CPT | Performed by: EMERGENCY MEDICINE

## 2025-05-17 PROCEDURE — 85652 RBC SED RATE AUTOMATED: CPT | Performed by: EMERGENCY MEDICINE

## 2025-05-17 RX ORDER — KETOROLAC TROMETHAMINE 30 MG/ML
15 INJECTION, SOLUTION INTRAMUSCULAR; INTRAVENOUS ONCE
Status: COMPLETED | OUTPATIENT
Start: 2025-05-17 | End: 2025-05-17

## 2025-05-17 RX ORDER — INDOMETHACIN 50 MG/1
50 CAPSULE ORAL
Qty: 30 CAPSULE | Refills: 0 | Status: SHIPPED | OUTPATIENT
Start: 2025-05-17

## 2025-05-17 RX ORDER — OXYCODONE HYDROCHLORIDE 5 MG/1
5 TABLET ORAL EVERY 4 HOURS PRN
Qty: 8 TABLET | Refills: 0 | Status: SHIPPED | OUTPATIENT
Start: 2025-05-17 | End: 2025-05-27

## 2025-05-17 RX ORDER — COLCHICINE 0.6 MG/1
0.6 TABLET ORAL ONCE
Status: COMPLETED | OUTPATIENT
Start: 2025-05-17 | End: 2025-05-17

## 2025-05-17 RX ORDER — COLCHICINE 0.6 MG/1
1.2 TABLET ORAL ONCE
Status: COMPLETED | OUTPATIENT
Start: 2025-05-17 | End: 2025-05-17

## 2025-05-17 RX ADMIN — COLCHICINE 1.2 MG: 0.6 TABLET ORAL at 12:20

## 2025-05-17 RX ADMIN — SODIUM CHLORIDE 500 ML: 0.9 INJECTION, SOLUTION INTRAVENOUS at 13:24

## 2025-05-17 RX ADMIN — SODIUM CHLORIDE 500 ML: 0.9 INJECTION, SOLUTION INTRAVENOUS at 12:27

## 2025-05-17 RX ADMIN — COLCHICINE 0.6 MG: 0.6 TABLET ORAL at 13:25

## 2025-05-17 RX ADMIN — KETOROLAC TROMETHAMINE 15 MG: 30 INJECTION, SOLUTION INTRAMUSCULAR at 12:20

## 2025-05-17 NOTE — ED PROVIDER NOTES
Time reflects when diagnosis was documented in both MDM as applicable and the Disposition within this note       Time User Action Codes Description Comment    5/17/2025  2:41 PM AuApurva brooke E Add [M25.571,  M25.471] Pain and swelling of right ankle     5/17/2025  2:42 PM Aufiero, Apurva E Add [M79.674,  M79.89] Pain and swelling of toe of right foot     5/17/2025  2:42 PM Aufiero Apurva E Remove [M79.674,  M79.89] Pain and swelling of toe of right foot     5/17/2025  2:42 PM Aufiero, Apurva E Add [M79.89] Swelling of right foot     5/17/2025  2:42 PM Aufiero Apurva E Add [M79.671] Right foot pain     5/17/2025  2:42 PM AuantoneroAnthonyy E Add [M10.9] Gout flare           ED Disposition       ED Disposition   Discharge    Condition   Stable    Date/Time   Sat May 17, 2025  2:42 PM    Comment   Lauri Hector discharge to home/self care.                   Assessment & Plan       Medical Decision Making  49-year-old male with history of gout presents to the ED for progressively worsening pain and, swelling of the right foot and ankle since Tuesday.  Overall I do suspect gout flareup.  Clinically, lower concern for septic arthritis however this is still on differential diagnosis.  Will start with x-rays to rule out occult injury.  Will also obtain basic labs including CRP and ESR for inflammatory markers, uric acid level.  Will treat symptomatically with small fluid bolus, Toradol, colchicine 1.2 mg with follow-up dose of 0.6 mg in 1 hour.  If labs are reassuring and patient has significant pain relief with medication, will discharge home with diagnosis of gout flare.  If significant lab derangement or no relief with medication, will consult orthopedic surgery for possible ankle arthrocentesis.    Amount and/or Complexity of Data Reviewed  Labs: ordered. Decision-making details documented in ED Course.  Radiology: ordered and independent interpretation performed. Decision-making details documented in ED  Course.    Risk  Prescription drug management.        ED Course as of 05/17/25 1633   Sat May 17, 2025   1219 Messaged on-call orthopedic surgeon, Dr. Keys.   1228 WBC: 9.19   1234 Sed Rate(!): 25   1251 LACTIC ACID: 2.0   1306 Procalcitonin: <0.05   1330 Patient reassessed and due for his next colchicine dose.  He states his pain overall is improved.  Updated patient about reassuring blood work at this time.   1423 Spoke with Dr. Keys, orthopedic surgeon on-call who reviewed x-ray images as well as blood work and stated that all of this is reassuring and he agrees that this is likely gouty arthritis flare and has low clinical suspicion for septic arthritis which in general is exceedingly rare in the absence of any penetrating injury.  He recommended treating for gout and having patient follow-up with rheumatology after discharge.   1438 Orthopedic AP, Isaias Moss, dilated patient at bedside and agrees with plan.  Discussed symptomatic management at home with patient as well as when to return to the ER.       Medications   sodium chloride 0.9 % bolus 500 mL (0 mL Intravenous Stopped 5/17/25 1326)   ketorolac (TORADOL) injection 15 mg (15 mg Intravenous Given 5/17/25 1220)   colchicine (COLCRYS) tablet 1.2 mg (1.2 mg Oral Given 5/17/25 1220)   sodium chloride 0.9 % bolus 500 mL (0 mL Intravenous Stopped 5/17/25 1435)   colchicine (COLCRYS) tablet 0.6 mg (0.6 mg Oral Given 5/17/25 1325)       ED Risk Strat Scores                    No data recorded        SBIRT 20yo+      Flowsheet Row Most Recent Value   Initial Alcohol Screen: US AUDIT-C     1. How often do you have a drink containing alcohol? 0 Filed at: 05/17/2025 1209   2. How many drinks containing alcohol do you have on a typical day you are drinking?  0 Filed at: 05/17/2025 1209   3a. Male UNDER 65: How often do you have five or more drinks on one occasion? 0 Filed at: 05/17/2025 1209   3b. FEMALE Any Age, or MALE 65+: How often do you have 4 or  more drinks on one occassion? 0 Filed at: 05/17/2025 1206   Audit-C Score 0 Filed at: 05/17/2025 1201   YAJAIRA: How many times in the past year have you...    Used an illegal drug or used a prescription medication for non-medical reasons? Never Filed at: 05/17/2025 1206                            History of Present Illness       Chief Complaint   Patient presents with    Ankle Pain     C/o right ankle pain since Thursday, states he has hx gout. No injuries.        Past Medical History:   Diagnosis Date    Acute gouty arthropathy 10/13/2017      History reviewed. No pertinent surgical history.   Family History   Problem Relation Age of Onset    Ovarian cancer Mother       Social History[1]   E-Cigarette/Vaping      E-Cigarette/Vaping Substances    Nicotine No     THC No     CBD No     Flavoring No     Other No     Unknown No       I have reviewed and agree with the history as documented.     Patient is a 49-year-old male with past medical history of gout, hypertension, hyperlipidemia, presents to the emergency department for possible gout flareup.  Patient states that on Tuesday he started with mild pain and swelling to the distal area of the foot including the first MTP joint.  He states the pain has been getting progressively worse especially since this past Thursday and now the pain and swelling is over the entire foot and right ankle.  He states he has had gout that involves the first MTP joint as well as the ankle, knee and wrist on the right side in the past.  He states this pain seems to be worse than prior gout flares but is very similar.  Denies any preceding injury to the right lower extremity prior to the pain starting.  He states he took Motrin last night but did not take anything today as of yet.  He does report redness over the foot and warmth which she has had before from prior gout flareups.  He states last night he felt very nauseous and felt as though he might of had a fever but when he checked his  temperature it was normal.  He denies any fevers or chills today, headache, dizziness or near syncope, vomiting, diarrhea, constipation, chest pain, shortness of breath, palpitations, abdominal pain, urinary symptoms, extremity weakness or paresthesia or other focal neurologic deficits.  Denies any recent medication or diet changes.      History provided by:  Patient and spouse   used: No    Ankle Pain  Associated symptoms: no back pain, no fever and no neck pain        Review of Systems   Constitutional:  Negative for chills and fever.   HENT:  Negative for congestion, ear pain, rhinorrhea and sore throat.    Respiratory:  Negative for cough, chest tightness, shortness of breath and wheezing.    Cardiovascular:  Negative for chest pain and palpitations.   Gastrointestinal:  Negative for abdominal pain, constipation, diarrhea, nausea and vomiting.   Genitourinary:  Negative for dysuria, flank pain, frequency and hematuria.   Musculoskeletal:  Positive for arthralgias and joint swelling. Negative for back pain, neck pain and neck stiffness.        +Right foot and ankle pain and swelling (atraumatic).   Skin:  Negative for color change, pallor, rash and wound.   Allergic/Immunologic: Negative for immunocompromised state.   Neurological:  Negative for dizziness, weakness, light-headedness, numbness and headaches.   Hematological:  Negative for adenopathy. Does not bruise/bleed easily.   Psychiatric/Behavioral:  Negative for confusion and decreased concentration.    All other systems reviewed and are negative.          Objective       ED Triage Vitals   Temperature Pulse Blood Pressure Respirations SpO2 Patient Position - Orthostatic VS   05/17/25 1143 05/17/25 1143 05/17/25 1143 05/17/25 1143 05/17/25 1143 05/17/25 1143   98.2 °F (36.8 °C) 103 (!) 170/103 18 99 % Sitting      Temp Source Heart Rate Source BP Location FiO2 (%) Pain Score    05/17/25 1143 05/17/25 1143 05/17/25 1143 -- 05/17/25 1220     Oral Monitor Left arm  8      Vitals      Date and Time Temp Pulse SpO2 Resp BP Pain Score FACES Pain Rating User   05/17/25 1430 -- -- -- -- 150/89 -- -- ENEIDA   05/17/25 1330 98.2 °F (36.8 °C) 95 100 % 20 159/98 -- -- CA   05/17/25 1324 -- -- -- -- -- 5 -- KOURTNEYK   05/17/25 1300 -- -- -- -- 160/96 -- -- JK   05/17/25 1220 -- -- -- -- -- 8 -- ENEIDA   05/17/25 1143 98.2 °F (36.8 °C) 103 99 % 18 170/103 -- -- JK            Physical Exam  Vitals and nursing note reviewed.   Constitutional:       General: He is not in acute distress.     Appearance: Normal appearance. He is well-developed. He is not ill-appearing, toxic-appearing or diaphoretic.   HENT:      Head: Normocephalic and atraumatic.      Right Ear: External ear normal.      Left Ear: External ear normal.      Nose: Nose normal.      Mouth/Throat:      Mouth: Mucous membranes are moist.      Pharynx: Oropharynx is clear.     Eyes:      Extraocular Movements: Extraocular movements intact.      Conjunctiva/sclera: Conjunctivae normal.     Neck:      Vascular: No JVD.     Cardiovascular:      Rate and Rhythm: Normal rate and regular rhythm.      Pulses: Normal pulses.      Heart sounds: Normal heart sounds. No murmur heard.     No friction rub. No gallop.   Pulmonary:      Effort: Pulmonary effort is normal. No respiratory distress.      Breath sounds: Normal breath sounds. No wheezing, rhonchi or rales.   Abdominal:      General: Bowel sounds are normal. There is no distension.      Palpations: Abdomen is soft.      Tenderness: There is no abdominal tenderness. There is no guarding or rebound.     Musculoskeletal:         General: Swelling and tenderness present. No deformity.      Cervical back: Normal range of motion and neck supple. No rigidity.      Comments: RIGHT LOWER EXTREMITY: Right foot and ankle joint edematous diffusely.  There is erythema and warmth over the dorsal aspect of the distal foot just proximal to the toes including around the first MTP joint.   Mild erythema over the lateral and medial right ankle joint with diffuse swelling and diffuse tenderness at the ankle joint.  Patient able to wiggle toes.  He has decreased active range of motion with dorsi flexion and significant decreased range of motion with plantarflexion.  I am able to passively dorsiflex the ankle slightly further but significant pain elicited with passive plantarflexion.  No tenderness above the ankle joint including at the knee joint.  No knee effusion.  Full range of motion right hip and knee joint.  2+ DP and PT pulses.     Skin:     General: Skin is warm and dry.      Coloration: Skin is not pale.      Findings: Erythema present. No rash.     Neurological:      General: No focal deficit present.      Mental Status: He is alert and oriented to person, place, and time.      Sensory: No sensory deficit.      Motor: No weakness.     Psychiatric:         Mood and Affect: Mood normal.         Behavior: Behavior normal.         Results Reviewed       Procedure Component Value Units Date/Time    C-reactive protein [[id number]]  (Abnormal) Collected: 05/17/25 1222    Lab Status: Final result Specimen: Blood from Arm, Left Updated: 05/17/25 1322     CRP 47.1 mg/L     Basic metabolic panel [[id number]] Collected: 05/17/25 1222    Lab Status: Final result Specimen: Blood from Arm, Left Updated: 05/17/25 1317     Sodium 137 mmol/L      Potassium 4.2 mmol/L      Chloride 100 mmol/L      CO2 30 mmol/L      ANION GAP 7 mmol/L      BUN 10 mg/dL      Creatinine 0.96 mg/dL      Glucose 107 mg/dL      Calcium 9.2 mg/dL      eGFR 93 ml/min/1.73sq m     Narrative:      National Kid[employer] Disease Foundation guidelines for Chronic Kidney Disease (CKD):     Stage 1 with normal or high GFR (GFR > 90 mL/min/1.73 square meters)    Stage 2 Mild CKD (GFR = 60-89 mL/min/1.73 square meters)    Stage 3A Moderate CKD (GFR = 45-59 mL/min/1.73 square meters)    Stage 3B Moderate CKD (GFR = 30-44 mL/min/1.73 square meters)     Stage 4 Severe CKD (GFR = 15-29 mL/min/1.73 square meters)    Stage 5 End Stage CKD (GFR <15 mL/min/1.73 square meters)  Note: GFR calculation is accurate only with a steady state creatinine    Hepatic function panel [[id number]]  (Abnormal) Collected: 05/17/25 1222    Lab Status: Final result Specimen: Blood from Arm, Left Updated: 05/17/25 1317     Total Bilirubin 0.58 mg/dL      Bilirubin, Direct 0.15 mg/dL      Alkaline Phosphatase 108 U/L      AST 21 U/L      ALT 34 U/L      Total Protein 7.7 g/dL      Albumin 4.0 g/dL     Uric acid [[id number]]  (Normal) Collected: 05/17/25 1222    Lab Status: Final result Specimen: Blood from Arm, Left Updated: 05/17/25 1317     Uric Acid 7.2 mg/dL     Narrative:      N-acetyl-p-benzoquinone imine (metabolite of Acetaminophen) will generate erroneously low results in samples for patients that have taken an overdose of Acetaminophen.    Procalcitonin [[id number]]  (Normal) Collected: 05/17/25 1222    Lab Status: Final result Specimen: Blood from Arm, Left Updated: 05/17/25 1258     Procalcitonin <0.05 ng/ml     Lactic acid, plasma (w/reflex if result > 2.0) [[id number]]  (Normal) Collected: 05/17/25 1222    Lab Status: Final result Specimen: Blood from Arm, Left Updated: 05/17/25 1249     LACTIC ACID 2.0 mmol/L     Narrative:      Result may be elevated if tourniquet was used during collection.    Sedimentation rate, automated [[id number]]  (Abnormal) Collected: 05/17/25 1222    Lab Status: Final result Specimen: Blood from Arm, Left Updated: 05/17/25 1230     Sed Rate 25 mm/hour     CBC and differential [[id number]]  (Abnormal) Collected: 05/17/25 1222    Lab Status: Final result Specimen: Blood from Arm, Left Updated: 05/17/25 1228     WBC 9.19 Thousand/uL      RBC 5.10 Million/uL      Hemoglobin 15.1 g/dL      Hematocrit 46.1 %      MCV 90 fL      MCH 29.6 pg      MCHC 32.8 g/dL      RDW 13.9 %      MPV 8.8 fL      Platelets 293 Thousands/uL      nRBC 0 /100 WBCs      Segmented %  78 %      Immature Grans % 0 %      Lymphocytes % 14 %      Monocytes % 8 %      Eosinophils Relative 0 %      Basophils Relative 0 %      Absolute Neutrophils 7.14 Thousands/µL      Absolute Immature Grans 0.04 Thousand/uL      Absolute Lymphocytes 1.25 Thousands/µL      Absolute Monocytes 0.69 Thousand/µL      Eosinophils Absolute 0.03 Thousand/µL      Basophils Absolute 0.04 Thousands/µL             XR ankle 3+ views RIGHT   ED Interpretation by Apurva Bennett DO (05/17 1236)   Suspect chronic calcification or chronic avulsion fracture adjacent to the medial malleolus.  No acute fracture or osseous abnormality.      XR foot 3+ views RIGHT   ED Interpretation by Apurva Bennett DO (05/17 1236)   No acute osseous abnormality.          Procedures    ED Medication and Procedure Management   Prior to Admission Medications   Prescriptions Last Dose Informant Patient Reported? Taking?   atorvastatin (LIPITOR) 20 mg tablet   No No   Sig: Take 1 tablet (20 mg total) by mouth daily   colchicine (COLCRYS) 0.6 mg tablet   No No   Sig: Take 1 tablet (0.6 mg total) by mouth daily for 12 days   ibuprofen (MOTRIN) 800 mg tablet   No No   Sig: Take 1 tablet (800 mg total) by mouth every 8 (eight) hours as needed for mild pain   indomethacin (INDOCIN) 50 mg capsule   No No   Sig: Take 1 capsule (50 mg total) by mouth 2 (two) times a day with meals for 2 days   lisinopril (ZESTRIL) 20 mg tablet   No No   Sig: Take 1 tablet (20 mg total) by mouth daily   tamsulosin (FLOMAX) 0.4 mg   No No   Sig: Take 1 capsule (0.4 mg total) by mouth daily with dinner      Facility-Administered Medications: None     Discharge Medication List as of 5/17/2025  2:45 PM        START taking these medications    Details   oxyCODONE (Roxicodone) 5 immediate release tablet Take 1 tablet (5 mg total) by mouth every 4 (four) hours as needed for severe pain for up to 10 days Max Daily Amount: 30 mg, Starting Sat 5/17/2025, Until Tue  5/27/2025 at 2359, Normal           CONTINUE these medications which have CHANGED    Details   indomethacin (INDOCIN) 50 mg capsule Take 1 capsule (50 mg total) by mouth 3 (three) times a day with meals, Starting Sat 5/17/2025, Normal           CONTINUE these medications which have NOT CHANGED    Details   atorvastatin (LIPITOR) 20 mg tablet Take 1 tablet (20 mg total) by mouth daily, Starting Thu 12/5/2024, Normal      colchicine (COLCRYS) 0.6 mg tablet Take 1 tablet (0.6 mg total) by mouth daily for 12 days, Starting Fri 4/19/2024, Until Wed 5/1/2024, Normal      ibuprofen (MOTRIN) 800 mg tablet Take 1 tablet (800 mg total) by mouth every 8 (eight) hours as needed for mild pain, Starting Fri 4/19/2024, Normal      lisinopril (ZESTRIL) 20 mg tablet Take 1 tablet (20 mg total) by mouth daily, Starting Mon 11/4/2024, Normal      tamsulosin (FLOMAX) 0.4 mg Take 1 capsule (0.4 mg total) by mouth daily with dinner, Starting Tue 10/22/2024, Normal             ED SEPSIS DOCUMENTATION   Time reflects when diagnosis was documented in both MDM as applicable and the Disposition within this note       Time User Action Codes Description Comment    5/17/2025  2:41 PM Apurva Bennett Add [M25.571,  M25.471] Pain and swelling of right ankle     5/17/2025  2:42 PM Apurva Bennett Add [M79.674,  M79.89] Pain and swelling of toe of right foot     5/17/2025  2:42 PM Apurva Bennett Remove [M79.674,  M79.89] Pain and swelling of toe of right foot     5/17/2025  2:42 PM Apurva Bennett Add [M79.89] Swelling of right foot     5/17/2025  2:42 PM Apurva Bennett Add [M79.671] Right foot pain     5/17/2025  2:42 PM Apurva Bennett Add [M10.9] Gout flare                    [1]   Social History  Tobacco Use    Smoking status: Light Smoker     Types: Cigars    Smokeless tobacco: Current    Tobacco comments:     occasional cigar   Substance Use Topics    Alcohol use: Yes     Comment: social    Drug use: No        Apurva Bennett,  DO  05/17/25 1633

## 2025-06-05 ENCOUNTER — OFFICE VISIT (OUTPATIENT)
Dept: URGENT CARE | Facility: CLINIC | Age: 50
End: 2025-06-05
Payer: COMMERCIAL

## 2025-06-05 VITALS
RESPIRATION RATE: 16 BRPM | OXYGEN SATURATION: 96 % | BODY MASS INDEX: 36.32 KG/M2 | SYSTOLIC BLOOD PRESSURE: 140 MMHG | TEMPERATURE: 98.2 F | WEIGHT: 253.13 LBS | DIASTOLIC BLOOD PRESSURE: 92 MMHG | HEART RATE: 95 BPM

## 2025-06-05 DIAGNOSIS — M10.9 ACUTE GOUT, UNSPECIFIED CAUSE, UNSPECIFIED SITE: ICD-10-CM

## 2025-06-05 PROCEDURE — S9083 URGENT CARE CENTER GLOBAL: HCPCS | Performed by: PHYSICIAN ASSISTANT

## 2025-06-05 PROCEDURE — G0382 LEV 3 HOSP TYPE B ED VISIT: HCPCS | Performed by: PHYSICIAN ASSISTANT

## 2025-06-05 RX ORDER — COLCHICINE 0.6 MG/1
TABLET ORAL
Qty: 3 TABLET | Refills: 0 | Status: SHIPPED | OUTPATIENT
Start: 2025-06-05 | End: 2025-06-12 | Stop reason: SDUPTHER

## 2025-06-05 NOTE — PROGRESS NOTES
"  [facility]        NAME: Lauri Hector is a 49 y.o. male  : 1975    MRN: [id number]  DATE: 2025  TIME: 12:17 PM    Assessment and Plan   No primary diagnosis found.  1. Acute gout, unspecified cause, unspecified site  colchicine (COLCRYS) 0.6 mg tablet        Patient Instructions     Take medicine as prescribed  Follow up with rheumatology  Follow up with PCP in 3-5 days.  Proceed to  ER if symptoms worsen.    If tests have been performed at [facility], our office will contact you with results if changes need to be made to the care plan discussed with you at the visit.  You can review your full results on [facility].    Chief Complaint     Chief Complaint   Patient presents with    Wrist Pain     Gout flare up in right wrist. Started yesterday. Swelling and pain, throbbing. Limited AKIRA.  No fever or chills.      History of Present Illness       50yo M presents c/o \"gout flare\".  He c/o pain and swelling in right wrist. He denies injury. He reports frequent gout flares, and was recently in ED for gout flare.  He request colchicine.     Wrist Pain   Pain location: right wrist. This is a new problem. The problem occurs constantly. The problem has been gradually worsening. The pain is moderate. Associated symptoms include joint swelling and a limited range of motion. Pertinent negatives include no fever, inability to bear weight or numbness. He has tried nothing for the symptoms.       Review of Systems   Review of Systems   Constitutional:  Negative for fever.   Respiratory:  Negative for shortness of breath.    Musculoskeletal:  Positive for arthralgias, joint swelling and myalgias.   Neurological:  Negative for numbness.         Current Medications     Current Medications[1]    Current Allergies     Allergies as of 2025    (No Known Allergies)            The following portions of the patient's history were reviewed and updated as appropriate: allergies, current " medications, past family history, past medical history, past social history, past surgical history and problem list.     Past Medical History[2]    Past Surgical History[3]    Family History[4]      Medications have been verified.        Objective   /92   Pulse 95   Temp 98.2 °F (36.8 °C)   Resp 16   Wt 115 kg (253 lb 2 oz)   SpO2 96%   BMI 36.32 kg/m²   No LMP for male patient.       Physical Exam     Physical Exam  Constitutional:       Appearance: Normal appearance.     Cardiovascular:      Rate and Rhythm: Normal rate and regular rhythm.   Pulmonary:      Effort: Pulmonary effort is normal.     Musculoskeletal:      Right wrist: Swelling, tenderness and bony tenderness present. No effusion. Decreased range of motion. Normal pulse.     Neurological:      Mental Status: He is alert.                        [1]   Current Outpatient Medications:     atorvastatin (LIPITOR) 20 mg tablet, Take 1 tablet (20 mg total) by mouth daily, Disp: 90 tablet, Rfl: 1    colchicine (COLCRYS) 0.6 mg tablet, Take two pills now and then take one tablet in 1 hour, Disp: 3 tablet, Rfl: 0    ibuprofen (MOTRIN) 800 mg tablet, Take 1 tablet (800 mg total) by mouth every 8 (eight) hours as needed for mild pain, Disp: 30 tablet, Rfl: 0    indomethacin (INDOCIN) 50 mg capsule, Take 1 capsule (50 mg total) by mouth 3 (three) times a day with meals, Disp: 30 capsule, Rfl: 0    lisinopril (ZESTRIL) 20 mg tablet, Take 1 tablet (20 mg total) by mouth daily, Disp: 90 tablet, Rfl: 1    tamsulosin (FLOMAX) 0.4 mg, Take 1 capsule (0.4 mg total) by mouth daily with dinner, Disp: 30 capsule, Rfl: 0  [2]   Past Medical History:  Diagnosis Date    Acute gouty arthropathy 10/13/2017   [3] No past surgical history on file.  [4]   Family History  Problem Relation Name Age of Onset    Ovarian cancer Mother

## 2025-06-06 ENCOUNTER — DOCUMENTATION (OUTPATIENT)
Dept: ADMINISTRATIVE | Facility: OTHER | Age: 50
End: 2025-06-06

## 2025-06-06 VITALS — DIASTOLIC BLOOD PRESSURE: 92 MMHG | SYSTOLIC BLOOD PRESSURE: 140 MMHG

## 2025-06-06 NOTE — PROGRESS NOTES
06/06/25 8:38 AM    Patient was called after the Urgent Care visit ; a message was left for the patient to return the call    Thank you.  MARISSA IZAGUIRRE MA  [facility]    Blood pressure elevated  Appointment department: [facility]  Appointment provider: Kaitlyn Pepe PA-C  Blood pressure   06/05/25 1052 140/92   06/05/25 1050 144/96

## 2025-06-09 NOTE — PROGRESS NOTES
Rheumatology Initial Outpatient Visit  Name: Lauri Hector      : 1975      MRN: [id number]  Encounter Provider: Joanna Duarte MD  Encounter Date: 2025   Encounter department: [facility] [address]  :  Assessment & Plan  Idiopathic gout, unspecified chronicity, unspecified site  49 year old male who presents for further evaluation of chronic non-tophaceous gout. He was diagnosed with gout in  based upon podagra.  Since then, he has had multiple flares with 5 occurring over the past 6 months.  Review of prior x-ray of the left foot shows likely gouty erosions at the first MTP.  Discussed the nature of gout and general treatment principles. Discussed given recurrent flares, I would recommend urate lowering therapy. Discussed this should be titrated until uric acid is less then 6.0. Begin colchicine 0.6 mg daily and then in 2 weeks begin allopurinol 100mg daily. Repeat uric acid every 2 weeks. We will increase allopurinol accordingly until uric acid is less than 6.0. Follow-up in 6 months.           History of Present Illness   HPI  Lauri Hector is a 49 y.o. male who presents for evaluation of gout.  Patient reports he was diagnosed with gout around .  Reports initially it started in his left MTP but over the years he has had experience with gout flares and left ankle, left knee, and left wrist.  Recently in the hospital in May due to flareup in the left foot.  He also recently had a fall onto his right hand and following this ended up developing flare in the right wrist.  He did see orthopedics who performed an injection into the wrist.  He reports typically he receives colchicine for flares.  This used to help a lot but now it seems like it has not been helping as much.  Previously tried taking allopurinol but when he took it he flared 3 times in 1 month.  He reports he was not on any prophylactic therapy at that time.    No family history of gout that he is  "aware of    Review of Systems  Complete ROS conducted as per HPI. In addition, denies:  Fever  Photosensitive rash  Sicca symptoms  Recurrent oral ulcers  Muscle weakness  Uveitis  Dactylitis  Chest pain  SOB  Pleurisy  Gross hematuria  Foamy urine  Raynaud's  Joint issues other than noted above      Objective   Ht 5' 10\" (1.778 m)   BMI 36.32 kg/m²      Physical Exam  Physical Exam  Constitutional: well appearing, no acute distress  HEENT: normocephalic, sclera clear, no visible oral or nasal ulcers  Neck: supple, no palpable cervical adenopathy  CV: regular rate and rhythm, no murmur  Pulm: normal respiratory effort, lungs clear to auscultation b/l  Skin: no rashes, no skin thickening  Extremities: warm and well perfused, no edema  MSK: Mild tenderness of the right wrist with decreased wrist extension, pain of the left knee with full extension    Labs and Imaging  I have personally reviewed pertinent labs and imaging.   "

## 2025-06-10 ENCOUNTER — HOSPITAL ENCOUNTER (EMERGENCY)
Facility: HOSPITAL | Age: 50
Discharge: HOME | End: 2025-06-10
Attending: EMERGENCY MEDICINE
Payer: COMMERCIAL

## 2025-06-10 ENCOUNTER — APPOINTMENT (EMERGENCY)
Dept: RADIOLOGY | Facility: HOSPITAL | Age: 50
End: 2025-06-10
Payer: COMMERCIAL

## 2025-06-10 ENCOUNTER — RESULTS FOLLOW-UP (OUTPATIENT)
Dept: EMERGENCY DEPT | Facility: HOSPITAL | Age: 50
End: 2025-06-10

## 2025-06-10 VITALS
SYSTOLIC BLOOD PRESSURE: 173 MMHG | DIASTOLIC BLOOD PRESSURE: 109 MMHG | OXYGEN SATURATION: 100 % | TEMPERATURE: 98.1 F | RESPIRATION RATE: 18 BRPM | HEART RATE: 94 BPM

## 2025-06-10 DIAGNOSIS — S69.90XA WRIST INJURY: Primary | ICD-10-CM

## 2025-06-10 PROCEDURE — 99283 EMERGENCY DEPT VISIT LOW MDM: CPT

## 2025-06-10 PROCEDURE — 73130 X-RAY EXAM OF HAND: CPT

## 2025-06-10 PROCEDURE — 73110 X-RAY EXAM OF WRIST: CPT

## 2025-06-10 NOTE — DISCHARGE INSTRUCTIONS
Tylenol/Motrin  Rest, Ice, Brace, Elevation  Follow up with primary care provider  Return to ER if symptoms worsen

## 2025-06-10 NOTE — ED PROVIDER NOTES
"Time reflects when diagnosis was documented in both MDM as applicable and the Disposition within this note       Time User Action Codes Description Comment    6/10/2025  9:24 AM Louann Gipson Add [S69.90XA] Wrist injury           ED Disposition       ED Disposition   Discharge    Condition   Stable    Date/Time   Tue Williams 10, 2025  9:24 AM    Comment   Lauri Hector discharge to home/self care.                   Assessment & Plan       Medical Decision Making  49-year-old male patient presents the ER for evaluation of right wrist pain.  Patient reports that he was remodeling a home when he punched a wall.  Patient thought that his hand would go right through the drywall although there was a pipe behind where he punched.  Patient is right-hand dominant.  Patient has noticeable swelling to his right hand and wrist.  He has generalized pain from his wrist down to his fingertips.  There is no noted ecchymosis, open wounds or erythema.  Patient was seen and evaluated at urgent care and was said that he had a acute gout flare and was given colchicine.  Patient took medications without relief. I have low suspicion for dislocation, significant ligamentous injury, septic arthritis, gout or new autoimmune arthropathy.  Patient was given a dynamic prefabricated splint which is removable for comfort and support.  Trays read and interpreted myself as no acute abnormalities.   advised to follow-up with orthopedics.  Multi modal regimen given for pain relief.  Return to the ER symptoms worsens or questions or concerns arise at home.      Amount and/or Complexity of Data Reviewed  Radiology: ordered.             Medications - No data to display    ED Risk Strat Scores                    No data recorded                            History of Present Illness       Chief Complaint   Patient presents with    Wrist Injury     C/o R wrist pain after a \" remodeling project where I punched drywall\"        Past Medical History[1] "   Past Surgical History[2]   Family History[3]   Social History[4]   E-Cigarette/Vaping    E-Cigarette Use Never User       E-Cigarette/Vaping Substances    Nicotine No     THC No     CBD No     Flavoring No     Other No     Unknown No       I have reviewed and agree with the history as documented.     48 y/o male patient presents to the ER for evaluation of wrist injury.           Review of Systems   Constitutional:  Negative for chills and fever.   HENT:  Negative for ear pain and sore throat.    Eyes:  Negative for pain and visual disturbance.   Respiratory:  Negative for cough and shortness of breath.    Cardiovascular:  Negative for chest pain and palpitations.   Gastrointestinal:  Negative for abdominal pain and vomiting.   Genitourinary:  Negative for dysuria and hematuria.   Musculoskeletal:  Positive for arthralgias. Negative for back pain.   Skin:  Negative for color change and rash.   Neurological:  Negative for seizures and syncope.   All other systems reviewed and are negative.          Objective       ED Triage Vitals [06/10/25 0852]   Temperature Pulse Blood Pressure Respirations SpO2 Patient Position - Orthostatic VS   98.1 °F (36.7 °C) 94 (!) 173/109 18 100 % Sitting      Temp Source Heart Rate Source BP Location FiO2 (%) Pain Score    Temporal Monitor Left arm -- --      Vitals      Date and Time Temp Pulse SpO2 Resp BP Pain Score FACES Pain Rating User   06/10/25 0852 98.1 °F (36.7 °C) 94 100 % 18 173/109 -- -- KW            Physical Exam  Vitals and nursing note reviewed.   Constitutional:       General: He is not in acute distress.     Appearance: He is well-developed.   HENT:      Head: Normocephalic and atraumatic.     Eyes:      Conjunctiva/sclera: Conjunctivae normal.       Cardiovascular:      Rate and Rhythm: Normal rate and regular rhythm.      Heart sounds: No murmur heard.  Pulmonary:      Effort: Pulmonary effort is normal. No respiratory distress.      Breath sounds: Normal breath  sounds.   Abdominal:      Palpations: Abdomen is soft.      Tenderness: There is no abdominal tenderness.     Musculoskeletal:         General: No swelling.      Right wrist: Tenderness present. Decreased range of motion. Normal pulse.      Cervical back: Neck supple.     Skin:     General: Skin is warm and dry.      Capillary Refill: Capillary refill takes less than 2 seconds.     Neurological:      Mental Status: He is alert.     Psychiatric:         Mood and Affect: Mood normal.         Results Reviewed       None            XR wrist 3+ views RIGHT    (Results Pending)   XR hand 3+ views RIGHT    (Results Pending)       Procedures    ED Medication and Procedure Management   Prior to Admission Medications   Prescriptions Last Dose Informant Patient Reported? Taking?   atorvastatin (LIPITOR) 20 mg tablet   No No   Sig: Take 1 tablet (20 mg total) by mouth daily   colchicine (COLCRYS) 0.6 mg tablet   No No   Sig: Take two pills now and then take one tablet in 1 hour   ibuprofen (MOTRIN) 800 mg tablet   No No   Sig: Take 1 tablet (800 mg total) by mouth every 8 (eight) hours as needed for mild pain   indomethacin (INDOCIN) 50 mg capsule   No No   Sig: Take 1 capsule (50 mg total) by mouth 3 (three) times a day with meals   lisinopril (ZESTRIL) 20 mg tablet   No No   Sig: Take 1 tablet (20 mg total) by mouth daily   tamsulosin (FLOMAX) 0.4 mg   No No   Sig: Take 1 capsule (0.4 mg total) by mouth daily with dinner      Facility-Administered Medications: None     Patient's Medications   Discharge Prescriptions    No medications on file       ED SEPSIS DOCUMENTATION   Time reflects when diagnosis was documented in both MDM as applicable and the Disposition within this note       Time User Action Codes Description Comment    6/10/2025  9:24 AM Louann Gipson Add [S69.90XA] Wrist injury                    BRITTNI England  06/10/25 0928         [1]   Past Medical History:  Diagnosis Date    Acute gouty arthropathy  10/13/2017   [2] No past surgical history on file.  [3]   Family History  Problem Relation Name Age of Onset    Ovarian cancer Mother     [4]   Social History  Tobacco Use    Smoking status: Light Smoker     Types: Cigars    Smokeless tobacco: Current     Types: Chew    Tobacco comments:     occasional cigar   Vaping Use    Vaping status: Never Used   Substance Use Topics    Alcohol use: Yes     Comment: social    Drug use: No        BRITTNI England  06/10/25 1012

## 2025-06-12 ENCOUNTER — CONSULT (OUTPATIENT)
Age: 50
End: 2025-06-12
Attending: EMERGENCY MEDICINE

## 2025-06-12 VITALS
BODY MASS INDEX: 35.65 KG/M2 | HEIGHT: 70 IN | DIASTOLIC BLOOD PRESSURE: 70 MMHG | SYSTOLIC BLOOD PRESSURE: 140 MMHG | HEART RATE: 79 BPM | OXYGEN SATURATION: 97 % | WEIGHT: 249 LBS

## 2025-06-12 DIAGNOSIS — M10.00 IDIOPATHIC GOUT, UNSPECIFIED CHRONICITY, UNSPECIFIED SITE: Primary | ICD-10-CM

## 2025-06-12 DIAGNOSIS — M10.9 ACUTE GOUT, UNSPECIFIED CAUSE, UNSPECIFIED SITE: ICD-10-CM

## 2025-06-12 RX ORDER — ALLOPURINOL 100 MG/1
100 TABLET ORAL DAILY
Qty: 90 TABLET | Refills: 3 | Status: SHIPPED | OUTPATIENT
Start: 2025-06-12

## 2025-06-12 RX ORDER — COLCHICINE 0.6 MG/1
0.6 TABLET ORAL DAILY
Qty: 90 TABLET | Refills: 3 | Status: SHIPPED | OUTPATIENT
Start: 2025-06-12

## 2025-06-12 NOTE — PATIENT INSTRUCTIONS
Start taking colchicine 0.6mg daily.   In 2 week, start allopurinol 100mg daily  In 4 weeks (or 2 weeks or starting allopurinol), go to lab to get get uric acid checked.   If uric acid is above 6.0, we will increase allopurinol to 200mg daily.

## 2025-06-17 ENCOUNTER — OFFICE VISIT (OUTPATIENT)
Dept: OBGYN CLINIC | Facility: CLINIC | Age: 50
End: 2025-06-17
Payer: COMMERCIAL

## 2025-06-17 VITALS — HEIGHT: 70 IN | BODY MASS INDEX: 35.65 KG/M2 | WEIGHT: 249 LBS

## 2025-06-17 DIAGNOSIS — S63.501A SPRAIN OF RIGHT WRIST, INITIAL ENCOUNTER: ICD-10-CM

## 2025-06-17 DIAGNOSIS — M19.031 ARTHRITIS OF RIGHT WRIST: Primary | ICD-10-CM

## 2025-06-17 PROCEDURE — 99214 OFFICE O/P EST MOD 30 MIN: CPT | Performed by: FAMILY MEDICINE

## 2025-06-17 PROCEDURE — 20605 DRAIN/INJ JOINT/BURSA W/O US: CPT | Performed by: FAMILY MEDICINE

## 2025-06-17 RX ORDER — KETOROLAC TROMETHAMINE 30 MG/ML
30 INJECTION, SOLUTION INTRAMUSCULAR; INTRAVENOUS
Status: COMPLETED | OUTPATIENT
Start: 2025-06-17 | End: 2025-06-17

## 2025-06-17 RX ORDER — BUPIVACAINE HYDROCHLORIDE 2.5 MG/ML
1 INJECTION, SOLUTION INFILTRATION; PERINEURAL
Status: COMPLETED | OUTPATIENT
Start: 2025-06-17 | End: 2025-06-17

## 2025-06-17 RX ADMIN — BUPIVACAINE HYDROCHLORIDE 1 ML: 2.5 INJECTION, SOLUTION INFILTRATION; PERINEURAL at 13:00

## 2025-06-17 RX ADMIN — KETOROLAC TROMETHAMINE 30 MG: 30 INJECTION, SOLUTION INTRAMUSCULAR; INTRAVENOUS at 13:00

## 2025-06-17 NOTE — ASSESSMENT & PLAN NOTE
49-year-old right-hand-dominant male with onset right wrist pain and swelling from injury 6/10/2025.  X-rays right wrist unremarkable for acute osseous abnormality.  There is scapholunate interval widening as well as radiocarpal degenerative changes.  Clinical impression is that he has symptoms from combination of aggravation arthritis and sprain to the wrist.  I discussed treatment in the form of protection/stabilization, anti-inflammatory, and supplements.  Offered anesthetic injection to which he agreed.  I administered mixture 1 cc 0.25% bupivacaine and 1 cc Toradol to the right radiocarpal joint without complication.  Apply topical diclofenac 3 times daily for 10 days.  Take turmeric, tart cherry, glucosamine supplement.  Wear wrist brace consistently for the next 3 weeks.  Refrain from weightbearing activity on right wrist.  Follow-up in 3 weeks for reevaluation and consideration of steroid injection.  Orders:    Ambulatory Referral to Orthopedic Surgery    Medium joint arthrocentesis: R radiocarpal

## 2025-06-17 NOTE — PATIENT INSTRUCTIONS
Over the counter vitamins:    - Turmeric vitamin at least 1000 mg daily    - Tart cherry vitamin at least 1000 mg daily    - Glucosamine-chondrointin 2-3 times daily    Epsom Salt Soaks    Over the counter diclofenac gel/voltaren  - 3 times daily for 10 days

## 2025-06-17 NOTE — ASSESSMENT & PLAN NOTE
49-year-old right-hand-dominant male with onset right wrist pain and swelling from injury 6/10/2025.  X-rays right wrist unremarkable for acute osseous abnormality.  There is scapholunate interval widening as well as radiocarpal degenerative changes.  Clinical impression is that he has symptoms from combination of aggravation arthritis and sprain to the wrist.  I discussed treatment in the form of protection/stabilization, anti-inflammatory, and supplements.  Offered anesthetic injection to which he agreed.  I administered mixture 1 cc 0.25% bupivacaine and 1 cc Toradol to the right radiocarpal joint without complication.  Apply topical diclofenac 3 times daily for 10 days.  Take turmeric, tart cherry, glucosamine supplement.  Wear wrist brace consistently for the next 3 weeks.  Refrain from weightbearing activity on right wrist.  Follow-up in 3 weeks for reevaluation and consideration of steroid injection.

## 2025-06-17 NOTE — PROGRESS NOTES
Name: Lauri Hector      : 1975      MRN: [id number]  Encounter Provider: Tyler Edmonds DO  Encounter Date: 2025   Encounter department: [facility] [place]  :  Assessment & Plan  Arthritis of right wrist  49-year-old right-hand-dominant male with onset right wrist pain and swelling from injury 6/10/2025.  X-rays right wrist unremarkable for acute osseous abnormality.  There is scapholunate interval widening as well as radiocarpal degenerative changes.  Clinical impression is that he has symptoms from combination of aggravation arthritis and sprain to the wrist.  I discussed treatment in the form of protection/stabilization, anti-inflammatory, and supplements.  Offered anesthetic injection to which he agreed.  I administered mixture 1 cc 0.25% bupivacaine and 1 cc Toradol to the right radiocarpal joint without complication.  Apply topical diclofenac 3 times daily for 10 days.  Take turmeric, tart cherry, glucosamine supplement.  Wear wrist brace consistently for the next 3 weeks.  Refrain from weightbearing activity on right wrist.  Follow-up in 3 weeks for reevaluation and consideration of steroid injection.  Orders:    Ambulatory Referral to Orthopedic Surgery    Medium joint arthrocentesis: R radiocarpal    Sprain of right wrist, initial encounter  49-year-old right-hand-dominant male with onset right wrist pain and swelling from injury 6/10/2025.  X-rays right wrist unremarkable for acute osseous abnormality.  There is scapholunate interval widening as well as radiocarpal degenerative changes.  Clinical impression is that he has symptoms from combination of aggravation arthritis and sprain to the wrist.  I discussed treatment in the form of protection/stabilization, anti-inflammatory, and supplements.  Offered anesthetic injection to which he agreed.  I administered mixture 1 cc 0.25% bupivacaine and 1 cc Toradol to the right radiocarpal  "joint without complication.  Apply topical diclofenac 3 times daily for 10 days.  Take turmeric, tart cherry, glucosamine supplement.  Wear wrist brace consistently for the next 3 weeks.  Refrain from weightbearing activity on right wrist.  Follow-up in 3 weeks for reevaluation and consideration of steroid injection.         Medium joint arthrocentesis: R radiocarpal    Performed by: Tyler Edmonds DO  Authorized by: Tyler Edmonds DO    Universal Protocol:  procedure performed by consultantConsent: Verbal consent obtained  Risks and benefits: risks, benefits and alternatives were discussed  Consent given by: patient  Time out: Immediately prior to procedure a \"time out\" was called to verify the correct patient, procedure, equipment, support staff and site/side marked as required.  Patient understanding: patient states understanding of the procedure being performed  Patient consent: the patient's understanding of the procedure matches consent given  Procedure consent: procedure consent matches procedure scheduled  Relevant documents: relevant documents present and verified  Test results: test results available and properly labeled  Site marked: the operative site was marked  Radiology Images displayed and confirmed. If images not available, report reviewed: imaging studies available  Required items: required blood products, implants, devices, and special equipment available  Patient identity confirmed: verbally with patient  Supporting Documentation  Indications: pain   Procedure Details  Location: wrist - R radiocarpal  Preparation: Patient was prepped and draped in the usual sterile fashion  Needle size: 27 G  Ultrasound guidance: no  Approach: dorsal  Medications administered: 1 mL bupivacaine 0.25 %; 30 mg ketorolac 30 mg/mL    Patient tolerance: patient tolerated the procedure well with no immediate complications  Dressing:  Sterile dressing applied          History of Present " "Illness   Chief Complaint   Patient presents with    Right Wrist - Pain, Clicking      HPI  Lauri Hector is a 49 y.o. male right-hand-dominant who presents for evaluation right wrist pain and swelling sustained from injury on 6/10/2025.  He has been doing some renovations at home.  He punched a wall of sheet rock to break it down and ended up striking a pipe within the wall.  He had pain described as sudden in onset, generalized to the wrist but worse at the radial aspect, radiating to the mid dorsal carpus, achy and throbbing, worse with moving the wrist, associate with sharp pain with bearing weight on the wrist, associated with clicking, and improved with resting.  He started having associated swelling.  He presented to the emergency room where x-ray was unremarkable for acute osseous abnormality.  He was provided with wrist brace and advised to follow-up with orthopedic care.  He has history of gout and arthritis of the wrist.  He had steroid injection to the wrist by Dr. Alan more than 1 year ago and states that after injection he did well.    History obtained from: patient    Review of Systems   Musculoskeletal:  Positive for arthralgias and joint swelling.   Neurological:  Negative for weakness and numbness.          Objective   Ht 5' 10\" (1.778 m)   Wt 113 kg (249 lb)   BMI 35.73 kg/m²      Physical Exam  Vitals and nursing note reviewed.   Constitutional:       General: He is not in acute distress.     Appearance: He is well-developed. He is not ill-appearing or diaphoretic.   HENT:      Head: Normocephalic and atraumatic.      Right Ear: External ear normal.      Left Ear: External ear normal.     Eyes:      General:         Right eye: No discharge.         Left eye: No discharge.     Pulmonary:      Effort: Pulmonary effort is normal. No respiratory distress.   Abdominal:      General: There is no distension.     Musculoskeletal:         General: Swelling and tenderness present. No deformity. "     Skin:     General: Skin is warm and dry.      Coloration: Skin is not jaundiced or pale.      Findings: No rash.     Neurological:      Mental Status: He is alert and oriented to person, place, and time.     Psychiatric:         Mood and Affect: Mood normal.         Behavior: Behavior normal.         Thought Content: Thought content normal.         Judgment: Judgment normal.       Right Hand Exam     Tenderness   Right hand tenderness location: Dorsum of radiocarpal joint, mild tenderness first CMC and scaphoid.    Range of Motion   Wrist   Extension:  35   Right wrist flexion: 15.   Pronation:  normal   Supination:  80     Muscle Strength   Wrist extension: 5/5   Wrist flexion: 5/5   : 4/5     Tests   Finkelstein's test: negative    Other   Sensation: normal  Pulse: present    Comments:  Positive TFCC grind  Negative DRUJ stress      Right Elbow Exam     Tenderness   The patient is experiencing no tenderness.     Range of Motion   The patient has normal right elbow ROM.    Muscle Strength   The patient has normal right elbow strength (5/5 flexion and extension).             I have personally reviewed pertinent films in PACS and my interpretation is  .   X-rays right wrist unremarkable for acute osseous abnormality.  There is scapholunate interval widening as well as radiocarpal degenerative changes.    Administrative Statements   I have spent a total time of 31 minutes in caring for this patient on the day of the visit/encounter including Diagnostic results, Prognosis, Risks and benefits of tx options, Instructions for management, Patient and family education, Importance of tx compliance, Impressions, Documenting in the medical record, Reviewing/placing orders in the medical record (including tests, medications, and/or procedures), Obtaining or reviewing history  , and performing procedure.

## 2025-07-08 ENCOUNTER — OFFICE VISIT (OUTPATIENT)
Dept: OBGYN CLINIC | Facility: CLINIC | Age: 50
End: 2025-07-08
Payer: COMMERCIAL

## 2025-07-08 VITALS — RESPIRATION RATE: 18 BRPM | HEIGHT: 70 IN | BODY MASS INDEX: 35.73 KG/M2

## 2025-07-08 DIAGNOSIS — M19.031 ARTHRITIS OF RIGHT WRIST: Primary | ICD-10-CM

## 2025-07-08 DIAGNOSIS — M18.11 ARTHRITIS OF CARPOMETACARPAL (CMC) JOINT OF RIGHT THUMB: ICD-10-CM

## 2025-07-08 PROCEDURE — 99213 OFFICE O/P EST LOW 20 MIN: CPT | Performed by: FAMILY MEDICINE

## 2025-07-08 NOTE — ASSESSMENT & PLAN NOTE
49-year-old right-hand-dominant with onset right wrist pain and swelling from injury 6/10/2025.  Clinical impression is that he has symptoms from combination of aggravation of arthritis and sprain to the wrist.  He is much improved following wrist brace, Toradol injection, and supplements.  He would like to defer steroid injection at this time.  I provided him CMC brace which he may wear as needed.  Continue with taking supplements.  Follow-up with me as needed.

## 2025-07-08 NOTE — PROGRESS NOTES
Name: Lauri Hector      : 1975      MRN: [id number]  Encounter Provider: Tyler Edmonds DO  Encounter Date: 2025   Encounter department: [facility] [place]  :  Assessment & Plan  Arthritis of right wrist  49-year-old right-hand-dominant with onset right wrist pain and swelling from injury 6/10/2025.  Clinical impression is that he has symptoms from combination of aggravation of arthritis and sprain to the wrist.  He is much improved following wrist brace, Toradol injection, and supplements.  He would like to defer steroid injection at this time.  I provided him CMC brace which he may wear as needed.  Continue with taking supplements.  Follow-up with me as needed.       Arthritis of carpometacarpal (CMC) joint of right thumb  49-year-old right-hand-dominant with onset right wrist pain and swelling from injury 6/10/2025.  Clinical impression is that he has symptoms from combination of aggravation of arthritis and sprain to the wrist.  He is much improved following wrist brace, Toradol injection, and supplements.  He would like to defer steroid injection at this time.  I provided him CMC brace which he may wear as needed.  Continue with taking supplements.  Follow-up with me as needed.  Orders:    CMC brace        History of Present Illness   Chief Complaint   Patient presents with    Right Wrist - Follow-up      HPI  Lauri Hector is a 49 y.o. male right-hand-dominant who presents for follow-up onset right wrist pain and swelling from injury 6/10/2025.  He was last seen by me 3 weeks ago at which point he was given Toradol injection.  He was advised on use of topical diclofenac, advised to continue wearing wrist brace and to start taking supplements.  He reports feeling much better since his last visit.  He is in the process of moving and states he was able to do repetitive lifting and carrying.  Does report that symptoms are little  The DP pulses were 2+ bilaterally. The radial pulses were +2 bilaterally. "aggravated with moving.  Reports having pain described localized to the base of the thumb at the CMC joint, none radiating, achy and sore, and mild in intensity.  He states overall he is feeling much better and after starting the vitamins his other joints including both knees and both ankles are feeling much better.    History obtained from: patient    Review of Systems   Musculoskeletal:  Positive for arthralgias. Negative for joint swelling.   Neurological:  Negative for weakness and numbness.          Objective   Resp 18   Ht 5' 10\" (1.778 m)   BMI 35.73 kg/m²      Physical Exam  Vitals and nursing note reviewed.   Constitutional:       Appearance: Normal appearance. He is well-developed. He is not ill-appearing or diaphoretic.   HENT:      Head: Normocephalic and atraumatic.      Right Ear: External ear normal.      Left Ear: External ear normal.     Eyes:      General:         Right eye: No discharge.         Left eye: No discharge.     Pulmonary:      Effort: Pulmonary effort is normal. No respiratory distress.   Abdominal:      General: There is no distension.     Musculoskeletal:         General: Tenderness present. No swelling.     Skin:     General: Skin is warm and dry.      Coloration: Skin is not jaundiced or pale.     Neurological:      Mental Status: He is alert and oriented to person, place, and time.     Psychiatric:         Mood and Affect: Mood normal.         Behavior: Behavior normal.         Thought Content: Thought content normal.         Judgment: Judgment normal.       Right Hand Exam     Tenderness   Right hand tenderness location: Mild tenderness first CMC.    Range of Motion   The patient has normal right wrist ROM.   Hand   MP Thumb: normal   DIP Thumb: normal     Muscle Strength   The patient has normal right wrist strength.    Tests   Finkelstein's test: negative    Other   Sensation: normal  Pulse: present    Comments:  Mild pain with CMC grind           Administrative Statements "   I have spent a total time of 20 minutes in caring for this patient on the day of the visit/encounter including Diagnostic results, Prognosis, Risks and benefits of tx options, Instructions for management, Impressions, Documenting in the medical record, Reviewing/placing orders in the medical record (including tests, medications, and/or procedures), and Obtaining or reviewing history  .

## 2025-07-08 NOTE — ASSESSMENT & PLAN NOTE
49-year-old right-hand-dominant with onset right wrist pain and swelling from injury 6/10/2025.  Clinical impression is that he has symptoms from combination of aggravation of arthritis and sprain to the wrist.  He is much improved following wrist brace, Toradol injection, and supplements.  He would like to defer steroid injection at this time.  I provided him CMC brace which he may wear as needed.  Continue with taking supplements.  Follow-up with me as needed.  Orders:    CMC brace